# Patient Record
Sex: FEMALE | Race: BLACK OR AFRICAN AMERICAN | NOT HISPANIC OR LATINO
[De-identification: names, ages, dates, MRNs, and addresses within clinical notes are randomized per-mention and may not be internally consistent; named-entity substitution may affect disease eponyms.]

---

## 2017-02-03 ENCOUNTER — APPOINTMENT (OUTPATIENT)
Dept: INTERNAL MEDICINE | Facility: CLINIC | Age: 27
End: 2017-02-03

## 2017-02-03 ENCOUNTER — MED ADMIN CHARGE (OUTPATIENT)
Age: 27
End: 2017-02-03

## 2017-02-03 VITALS
WEIGHT: 147 LBS | SYSTOLIC BLOOD PRESSURE: 115 MMHG | DIASTOLIC BLOOD PRESSURE: 62 MMHG | TEMPERATURE: 98.1 F | HEART RATE: 65 BPM | RESPIRATION RATE: 16 BRPM | BODY MASS INDEX: 23.63 KG/M2 | HEIGHT: 66 IN | OXYGEN SATURATION: 98 %

## 2017-02-03 DIAGNOSIS — Z78.9 OTHER SPECIFIED HEALTH STATUS: ICD-10-CM

## 2017-02-05 LAB
ALBUMIN SERPL ELPH-MCNC: 4.5 G/DL
ALP BLD-CCNC: 64 U/L
ALT SERPL-CCNC: 8 U/L
ANION GAP SERPL CALC-SCNC: 18 MMOL/L
AST SERPL-CCNC: 15 U/L
BASOPHILS # BLD AUTO: 0.02 K/UL
BASOPHILS NFR BLD AUTO: 0.3 %
BILIRUB SERPL-MCNC: 0.5 MG/DL
BUN SERPL-MCNC: 12 MG/DL
C TRACH DNA SPEC QL NAA+PROBE: NORMAL
C TRACH RRNA SPEC QL NAA+PROBE: NORMAL
CALCIUM SERPL-MCNC: 9.5 MG/DL
CHLORIDE SERPL-SCNC: 102 MMOL/L
CHOLEST SERPL-MCNC: 150 MG/DL
CHOLEST/HDLC SERPL: 1.9 RATIO
CO2 SERPL-SCNC: 21 MMOL/L
CREAT SERPL-MCNC: 0.6 MG/DL
EOSINOPHIL # BLD AUTO: 0.01 K/UL
EOSINOPHIL NFR BLD AUTO: 0.1 %
GLUCOSE SERPL-MCNC: 81 MG/DL
HBA1C MFR BLD HPLC: 5.9 %
HBV CORE IGG+IGM SER QL: NONREACTIVE
HBV SURFACE AB SER QL: REACTIVE
HBV SURFACE AG SER QL: NONREACTIVE
HCT VFR BLD CALC: 40 %
HCV AB SER QL: NONREACTIVE
HCV S/CO RATIO: 0.22 S/CO
HDLC SERPL-MCNC: 80 MG/DL
HGB BLD-MCNC: 12.4 G/DL
HIV1+2 AB SPEC QL IA.RAPID: NONREACTIVE
IMM GRANULOCYTES NFR BLD AUTO: 0 %
LDLC SERPL CALC-MCNC: 62 MG/DL
LYMPHOCYTES # BLD AUTO: 1.92 K/UL
LYMPHOCYTES NFR BLD AUTO: 27.1 %
MAN DIFF?: NORMAL
MCHC RBC-ENTMCNC: 27 PG
MCHC RBC-ENTMCNC: 31 GM/DL
MCV RBC AUTO: 87 FL
MONOCYTES # BLD AUTO: 0.42 K/UL
MONOCYTES NFR BLD AUTO: 5.9 %
N GONORRHOEA DNA SPEC QL NAA+PROBE: NORMAL
N GONORRHOEA RRNA SPEC QL NAA+PROBE: NORMAL
NEUTROPHILS # BLD AUTO: 4.71 K/UL
NEUTROPHILS NFR BLD AUTO: 66.6 %
PLATELET # BLD AUTO: 383 K/UL
POTASSIUM SERPL-SCNC: 4.4 MMOL/L
PROT SERPL-MCNC: 7.8 G/DL
RBC # BLD: 4.6 M/UL
RBC # FLD: 14.6 %
SODIUM SERPL-SCNC: 141 MMOL/L
SOURCE AMPLIFICATION: NORMAL
T PALLIDUM AB SER QL IA: NEGATIVE
TRIGL SERPL-MCNC: 38 MG/DL
TSH SERPL-ACNC: 0.81 UIU/ML
WBC # FLD AUTO: 7.08 K/UL

## 2017-02-16 ENCOUNTER — APPOINTMENT (OUTPATIENT)
Dept: INTERNAL MEDICINE | Facility: CLINIC | Age: 27
End: 2017-02-16

## 2017-02-16 VITALS
HEART RATE: 110 BPM | OXYGEN SATURATION: 98 % | BODY MASS INDEX: 23.63 KG/M2 | TEMPERATURE: 97.9 F | WEIGHT: 147 LBS | SYSTOLIC BLOOD PRESSURE: 114 MMHG | DIASTOLIC BLOOD PRESSURE: 60 MMHG | HEIGHT: 66 IN

## 2017-02-21 ENCOUNTER — APPOINTMENT (OUTPATIENT)
Dept: ALLERGY | Facility: CLINIC | Age: 27
End: 2017-02-21

## 2017-02-21 VITALS
WEIGHT: 147 LBS | DIASTOLIC BLOOD PRESSURE: 80 MMHG | HEIGHT: 66 IN | SYSTOLIC BLOOD PRESSURE: 120 MMHG | RESPIRATION RATE: 14 BRPM | HEART RATE: 88 BPM | BODY MASS INDEX: 23.63 KG/M2

## 2017-03-01 ENCOUNTER — HOSPITAL ENCOUNTER (EMERGENCY)
Facility: HOSPITAL | Age: 27
Discharge: HOME | End: 2017-03-01
Attending: EMERGENCY MEDICINE | Admitting: EMERGENCY MEDICINE
Payer: COMMERCIAL

## 2017-03-01 VITALS
TEMPERATURE: 97.8 F | HEIGHT: 66 IN | BODY MASS INDEX: 23.63 KG/M2 | DIASTOLIC BLOOD PRESSURE: 74 MMHG | SYSTOLIC BLOOD PRESSURE: 116 MMHG | WEIGHT: 147 LBS | RESPIRATION RATE: 16 BRPM | HEART RATE: 95 BPM | OXYGEN SATURATION: 100 %

## 2017-03-01 DIAGNOSIS — J02.9 PHARYNGITIS: Primary | ICD-10-CM

## 2017-03-01 LAB — S PYO AG THROAT QL: NEGATIVE

## 2017-03-01 PROCEDURE — 99284 EMERGENCY DEPT VISIT MOD MDM: CPT

## 2017-03-01 PROCEDURE — 87430 STREP A AG IA: CPT | Performed by: EMERGENCY MEDICINE

## 2017-03-01 PROCEDURE — 87070 CULTURE OTHR SPECIMN AEROBIC: CPT | Performed by: EMERGENCY MEDICINE

## 2017-03-01 RX ORDER — PREDNISONE 20 MG/1
60 TABLET ORAL ONCE
Status: COMPLETED | OUTPATIENT
Start: 2017-03-01 | End: 2017-03-01

## 2017-03-01 RX ORDER — PREDNISONE 20 MG/1
60 TABLET ORAL DAILY
Qty: 15 TABLET | Refills: 0 | Status: SHIPPED | OUTPATIENT
Start: 2017-03-01 | End: 2017-03-06

## 2017-03-01 RX ADMIN — PREDNISONE 60 MG: 20 TABLET ORAL at 23:43

## 2017-03-04 LAB — BACTERIA THROAT CULT: NORMAL

## 2017-03-07 ENCOUNTER — APPOINTMENT (OUTPATIENT)
Dept: INTERNAL MEDICINE | Facility: CLINIC | Age: 27
End: 2017-03-07

## 2017-03-07 VITALS
HEIGHT: 66 IN | SYSTOLIC BLOOD PRESSURE: 115 MMHG | DIASTOLIC BLOOD PRESSURE: 70 MMHG | BODY MASS INDEX: 22.98 KG/M2 | WEIGHT: 143 LBS | HEART RATE: 74 BPM | TEMPERATURE: 98.5 F

## 2017-03-07 RX ORDER — PREDNISONE 20 MG/1
20 TABLET ORAL
Qty: 15 | Refills: 0 | Status: COMPLETED | COMMUNITY
Start: 2017-03-01

## 2017-03-10 ENCOUNTER — APPOINTMENT (OUTPATIENT)
Dept: INTERNAL MEDICINE | Facility: CLINIC | Age: 27
End: 2017-03-10

## 2017-03-15 ENCOUNTER — APPOINTMENT (OUTPATIENT)
Dept: OTOLARYNGOLOGY | Facility: CLINIC | Age: 27
End: 2017-03-15

## 2017-03-20 ENCOUNTER — APPOINTMENT (OUTPATIENT)
Dept: OTOLARYNGOLOGY | Facility: CLINIC | Age: 27
End: 2017-03-20

## 2017-03-20 VITALS
WEIGHT: 143 LBS | DIASTOLIC BLOOD PRESSURE: 72 MMHG | SYSTOLIC BLOOD PRESSURE: 112 MMHG | OXYGEN SATURATION: 98 % | BODY MASS INDEX: 22.98 KG/M2 | HEART RATE: 90 BPM | TEMPERATURE: 98.8 F | HEIGHT: 66 IN

## 2017-03-24 ENCOUNTER — APPOINTMENT (OUTPATIENT)
Dept: OTOLARYNGOLOGY | Facility: CLINIC | Age: 27
End: 2017-03-24

## 2017-04-20 ENCOUNTER — APPOINTMENT (OUTPATIENT)
Dept: NEUROLOGY | Facility: CLINIC | Age: 27
End: 2017-04-20

## 2017-05-05 ENCOUNTER — APPOINTMENT (OUTPATIENT)
Dept: INTERNAL MEDICINE | Facility: CLINIC | Age: 27
End: 2017-05-05

## 2017-05-05 VITALS
RESPIRATION RATE: 14 BRPM | DIASTOLIC BLOOD PRESSURE: 80 MMHG | SYSTOLIC BLOOD PRESSURE: 115 MMHG | BODY MASS INDEX: 22.18 KG/M2 | HEART RATE: 75 BPM | WEIGHT: 138 LBS | HEIGHT: 66 IN | TEMPERATURE: 98.2 F

## 2017-05-05 LAB — HBA1C MFR BLD HPLC: 5.9

## 2017-05-12 ENCOUNTER — APPOINTMENT (OUTPATIENT)
Dept: NEUROLOGY | Facility: CLINIC | Age: 27
End: 2017-05-12

## 2017-05-12 VITALS
DIASTOLIC BLOOD PRESSURE: 70 MMHG | HEIGHT: 66 IN | BODY MASS INDEX: 22.02 KG/M2 | WEIGHT: 137 LBS | SYSTOLIC BLOOD PRESSURE: 112 MMHG

## 2017-08-08 ENCOUNTER — APPOINTMENT (OUTPATIENT)
Dept: INTERNAL MEDICINE | Facility: CLINIC | Age: 27
End: 2017-08-08
Payer: COMMERCIAL

## 2017-08-08 VITALS
WEIGHT: 139 LBS | DIASTOLIC BLOOD PRESSURE: 68 MMHG | HEIGHT: 66 IN | HEART RATE: 71 BPM | BODY MASS INDEX: 22.34 KG/M2 | RESPIRATION RATE: 14 BRPM | OXYGEN SATURATION: 98 % | TEMPERATURE: 98.4 F | SYSTOLIC BLOOD PRESSURE: 116 MMHG

## 2017-08-08 LAB — HBA1C MFR BLD HPLC: 5.9

## 2017-08-08 PROCEDURE — 99214 OFFICE O/P EST MOD 30 MIN: CPT | Mod: 25

## 2017-08-08 PROCEDURE — 83036 HEMOGLOBIN GLYCOSYLATED A1C: CPT | Mod: QW

## 2017-08-19 ENCOUNTER — FORM ENCOUNTER (OUTPATIENT)
Age: 27
End: 2017-08-19

## 2017-08-20 ENCOUNTER — OUTPATIENT (OUTPATIENT)
Dept: OUTPATIENT SERVICES | Facility: HOSPITAL | Age: 27
LOS: 1 days | End: 2017-08-20
Payer: COMMERCIAL

## 2017-08-20 ENCOUNTER — APPOINTMENT (OUTPATIENT)
Dept: MRI IMAGING | Facility: CLINIC | Age: 27
End: 2017-08-20
Payer: COMMERCIAL

## 2017-08-20 DIAGNOSIS — Q85.00 NEUROFIBROMATOSIS, UNSPECIFIED: ICD-10-CM

## 2017-08-20 PROCEDURE — 70551 MRI BRAIN STEM W/O DYE: CPT | Mod: 26

## 2017-08-20 PROCEDURE — 70551 MRI BRAIN STEM W/O DYE: CPT

## 2017-09-05 ENCOUNTER — APPOINTMENT (OUTPATIENT)
Dept: DERMATOLOGY | Facility: CLINIC | Age: 27
End: 2017-09-05
Payer: COMMERCIAL

## 2017-09-05 ENCOUNTER — RESULT REVIEW (OUTPATIENT)
Age: 27
End: 2017-09-05

## 2017-09-05 VITALS
HEIGHT: 66 IN | DIASTOLIC BLOOD PRESSURE: 60 MMHG | WEIGHT: 140 LBS | SYSTOLIC BLOOD PRESSURE: 102 MMHG | BODY MASS INDEX: 22.5 KG/M2

## 2017-09-05 DIAGNOSIS — L70.0 ACNE VULGARIS: ICD-10-CM

## 2017-09-05 PROBLEM — Z00.00 ENCOUNTER FOR PREVENTIVE HEALTH EXAMINATION: Noted: 2017-09-05

## 2017-09-05 PROCEDURE — 99243 OFF/OP CNSLTJ NEW/EST LOW 30: CPT | Mod: GC

## 2017-09-28 ENCOUNTER — APPOINTMENT (OUTPATIENT)
Dept: PLASTIC SURGERY | Facility: CLINIC | Age: 27
End: 2017-09-28
Payer: COMMERCIAL

## 2017-09-28 VITALS
BODY MASS INDEX: 22.02 KG/M2 | HEIGHT: 66 IN | HEART RATE: 63 BPM | SYSTOLIC BLOOD PRESSURE: 113 MMHG | WEIGHT: 137 LBS | DIASTOLIC BLOOD PRESSURE: 74 MMHG

## 2017-09-28 DIAGNOSIS — Z78.9 OTHER SPECIFIED HEALTH STATUS: ICD-10-CM

## 2017-09-28 PROCEDURE — 99203 OFFICE O/P NEW LOW 30 MIN: CPT

## 2017-11-09 ENCOUNTER — OUTPATIENT (OUTPATIENT)
Dept: OUTPATIENT SERVICES | Facility: HOSPITAL | Age: 27
LOS: 1 days | End: 2017-11-09
Payer: COMMERCIAL

## 2017-11-09 VITALS
OXYGEN SATURATION: 100 % | SYSTOLIC BLOOD PRESSURE: 109 MMHG | RESPIRATION RATE: 14 BRPM | WEIGHT: 139.99 LBS | TEMPERATURE: 99 F | DIASTOLIC BLOOD PRESSURE: 70 MMHG | HEIGHT: 66 IN | HEART RATE: 84 BPM

## 2017-11-09 DIAGNOSIS — Z01.818 ENCOUNTER FOR OTHER PREPROCEDURAL EXAMINATION: ICD-10-CM

## 2017-11-09 DIAGNOSIS — D36.13 BENIGN NEOPLASM OF PERIPHERAL NERVES AND AUTONOMIC NERVOUS SYSTEM OF LOWER LIMB, INCLUDING HIP: ICD-10-CM

## 2017-11-09 DIAGNOSIS — D36.10 BENIGN NEOPLASM OF PERIPHERAL NERVES AND AUTONOMIC NERVOUS SYSTEM, UNSPECIFIED: ICD-10-CM

## 2017-11-09 PROCEDURE — 85027 COMPLETE CBC AUTOMATED: CPT

## 2017-11-09 PROCEDURE — G0463: CPT

## 2017-11-09 RX ORDER — SODIUM CHLORIDE 9 MG/ML
3 INJECTION INTRAMUSCULAR; INTRAVENOUS; SUBCUTANEOUS EVERY 8 HOURS
Qty: 0 | Refills: 0 | Status: DISCONTINUED | OUTPATIENT
Start: 2017-11-13 | End: 2017-11-28

## 2017-11-09 RX ORDER — LIDOCAINE HCL 20 MG/ML
0.2 VIAL (ML) INJECTION ONCE
Qty: 0 | Refills: 0 | Status: DISCONTINUED | OUTPATIENT
Start: 2017-11-13 | End: 2017-11-28

## 2017-11-09 RX ORDER — ACETAMINOPHEN 500 MG
975 TABLET ORAL ONCE
Qty: 0 | Refills: 0 | Status: COMPLETED | OUTPATIENT
Start: 2017-11-13 | End: 2017-11-13

## 2017-11-09 NOTE — H&P PST ADULT - PMH
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

## 2017-11-09 NOTE — H&P PST ADULT - NSANTHOSAYNRD_GEN_A_CORE
No. SULLY screening performed.  STOP BANG Legend: 0-2 = LOW Risk; 3-4 = INTERMEDIATE Risk; 5-8 = HIGH Risk

## 2017-11-09 NOTE — H&P PST ADULT - PROBLEM SELECTOR PLAN 1
excision of neurofibroma right thigh   PST instructions provided, soap given, patient verbalized understanding.   UcG on admission

## 2017-11-09 NOTE — H&P PST ADULT - HISTORY OF PRESENT ILLNESS
27 yr old female with right thigh neurofibroma ( 13 cm ) , presents to [facility] for scheduled excision on 11/13/17. Patient denies fever, chills , no acute complains.

## 2017-11-10 LAB
HCT VFR BLD CALC: 37.7 % — SIGNIFICANT CHANGE UP (ref 34.5–45)
HGB BLD-MCNC: 12.6 G/DL — SIGNIFICANT CHANGE UP (ref 11.5–15.5)
MCHC RBC-ENTMCNC: 29.2 PG — SIGNIFICANT CHANGE UP (ref 27–34)
MCHC RBC-ENTMCNC: 33.4 GM/DL — SIGNIFICANT CHANGE UP (ref 32–36)
MCV RBC AUTO: 87.3 FL — SIGNIFICANT CHANGE UP (ref 80–100)
PLATELET # BLD AUTO: 293 K/UL — SIGNIFICANT CHANGE UP (ref 150–400)
RBC # BLD: 4.32 M/UL — SIGNIFICANT CHANGE UP (ref 3.8–5.2)
RBC # FLD: 14.9 % — HIGH (ref 10.3–14.5)
WBC # BLD: 6.81 K/UL — SIGNIFICANT CHANGE UP (ref 3.8–10.5)
WBC # FLD AUTO: 6.81 K/UL — SIGNIFICANT CHANGE UP (ref 3.8–10.5)

## 2017-11-12 RX ORDER — ONDANSETRON 8 MG/1
4 TABLET, FILM COATED ORAL ONCE
Qty: 0 | Refills: 0 | Status: DISCONTINUED | OUTPATIENT
Start: 2017-11-13 | End: 2017-11-28

## 2017-11-12 RX ORDER — CELECOXIB 200 MG/1
200 CAPSULE ORAL ONCE
Qty: 0 | Refills: 0 | Status: DISCONTINUED | OUTPATIENT
Start: 2017-11-13 | End: 2017-11-28

## 2017-11-12 RX ORDER — SODIUM CHLORIDE 9 MG/ML
1000 INJECTION, SOLUTION INTRAVENOUS
Qty: 0 | Refills: 0 | Status: DISCONTINUED | OUTPATIENT
Start: 2017-11-13 | End: 2017-11-28

## 2017-11-13 ENCOUNTER — TRANSCRIPTION ENCOUNTER (OUTPATIENT)
Age: 27
End: 2017-11-13

## 2017-11-13 ENCOUNTER — APPOINTMENT (OUTPATIENT)
Dept: PLASTIC SURGERY | Facility: HOSPITAL | Age: 27
End: 2017-11-13

## 2017-11-13 ENCOUNTER — OUTPATIENT (OUTPATIENT)
Dept: OUTPATIENT SERVICES | Facility: HOSPITAL | Age: 27
LOS: 1 days | End: 2017-11-13
Payer: COMMERCIAL

## 2017-11-13 ENCOUNTER — RESULT REVIEW (OUTPATIENT)
Age: 27
End: 2017-11-13

## 2017-11-13 VITALS
OXYGEN SATURATION: 100 % | RESPIRATION RATE: 20 BRPM | HEIGHT: 66 IN | WEIGHT: 139.99 LBS | HEART RATE: 63 BPM | SYSTOLIC BLOOD PRESSURE: 103 MMHG | TEMPERATURE: 98 F | DIASTOLIC BLOOD PRESSURE: 71 MMHG

## 2017-11-13 VITALS
HEART RATE: 76 BPM | RESPIRATION RATE: 16 BRPM | OXYGEN SATURATION: 99 % | TEMPERATURE: 100 F | SYSTOLIC BLOOD PRESSURE: 123 MMHG | DIASTOLIC BLOOD PRESSURE: 66 MMHG

## 2017-11-13 DIAGNOSIS — D36.10 BENIGN NEOPLASM OF PERIPHERAL NERVES AND AUTONOMIC NERVOUS SYSTEM, UNSPECIFIED: ICD-10-CM

## 2017-11-13 PROCEDURE — 13133 CMPLX RPR F/C/C/M/N/AX/G/H/F: CPT | Mod: 59

## 2017-11-13 PROCEDURE — 88304 TISSUE EXAM BY PATHOLOGIST: CPT | Mod: 26

## 2017-11-13 PROCEDURE — 11406 EXC TR-EXT B9+MARG >4.0 CM: CPT

## 2017-11-13 PROCEDURE — 88304 TISSUE EXAM BY PATHOLOGIST: CPT

## 2017-11-13 PROCEDURE — 13132 CMPLX RPR F/C/C/M/N/AX/G/H/F: CPT

## 2017-11-13 PROCEDURE — 13122 CMPLX RPR S/A/L ADDL 5 CM/>: CPT

## 2017-11-13 PROCEDURE — 13121 CMPLX RPR S/A/L 2.6-7.5 CM: CPT

## 2017-11-13 RX ORDER — CELECOXIB 200 MG/1
200 CAPSULE ORAL ONCE
Qty: 0 | Refills: 0 | Status: COMPLETED | OUTPATIENT
Start: 2017-11-13 | End: 2017-11-13

## 2017-11-13 RX ORDER — FAMOTIDINE 10 MG/ML
1 INJECTION INTRAVENOUS
Qty: 0 | Refills: 0 | COMMUNITY

## 2017-11-13 RX ADMIN — CELECOXIB 200 MILLIGRAM(S): 200 CAPSULE ORAL at 08:48

## 2017-11-13 RX ADMIN — Medication 975 MILLIGRAM(S): at 08:48

## 2017-11-13 NOTE — ASU DISCHARGE PLAN (ADULT/PEDIATRIC). - MEDICATION SUMMARY - MEDICATIONS TO TAKE
I will START or STAY ON the medications listed below when I get home from the hospital:    oxyCODONE-acetaminophen 5 mg-325 mg oral tablet  -- 1 tab(s) by mouth every 6 hours, As Needed -for moderate pain MDD:4 tabs  -- Caution federal law prohibits the transfer of this drug to any person other  than the person for whom it was prescribed.  May cause drowsiness.  Alcohol may intensify this effect.  Use care when operating dangerous machinery.  This prescription cannot be refilled.  This product contains acetaminophen.  Do not use  with any other product containing acetaminophen to prevent possible liver damage.  Using more of this medication than prescribed may cause serious breathing problems.    -- Indication: For Post-operative pain    biotin  -- 2 tab(s) by mouth once a day  -- Indication: For Home supplement

## 2017-11-13 NOTE — ASU DISCHARGE PLAN (ADULT/PEDIATRIC). - MEDICATION SUMMARY - MEDICATIONS TO STOP TAKING
I will STOP taking the medications listed below when I get home from the hospital:    famotidine 20 mg oral tablet  -- 1 tab(s) by mouth twice, as directed

## 2017-11-13 NOTE — ASU DISCHARGE PLAN (ADULT/PEDIATRIC). - SPECIAL INSTRUCTIONS
Leave ace wrap on for 3 days. May remove prior to shower. May shower 3 days after surgery. No vigorous activity; no sex; no scrubbing incision during shower.

## 2017-11-21 ENCOUNTER — APPOINTMENT (OUTPATIENT)
Dept: PLASTIC SURGERY | Facility: CLINIC | Age: 27
End: 2017-11-21
Payer: COMMERCIAL

## 2017-11-21 LAB — SURGICAL PATHOLOGY STUDY: SIGNIFICANT CHANGE UP

## 2017-11-21 PROCEDURE — 99024 POSTOP FOLLOW-UP VISIT: CPT

## 2017-12-05 ENCOUNTER — APPOINTMENT (OUTPATIENT)
Dept: PLASTIC SURGERY | Facility: CLINIC | Age: 27
End: 2017-12-05
Payer: COMMERCIAL

## 2017-12-05 PROCEDURE — 10160 PNXR ASPIR ABSC HMTMA BULLA: CPT

## 2017-12-08 ENCOUNTER — APPOINTMENT (OUTPATIENT)
Dept: PLASTIC SURGERY | Facility: CLINIC | Age: 27
End: 2017-12-08
Payer: COMMERCIAL

## 2017-12-08 PROCEDURE — 99024 POSTOP FOLLOW-UP VISIT: CPT

## 2017-12-12 ENCOUNTER — APPOINTMENT (OUTPATIENT)
Dept: PLASTIC SURGERY | Facility: CLINIC | Age: 27
End: 2017-12-12
Payer: COMMERCIAL

## 2017-12-12 PROCEDURE — 99024 POSTOP FOLLOW-UP VISIT: CPT

## 2017-12-14 ENCOUNTER — APPOINTMENT (OUTPATIENT)
Dept: PLASTIC SURGERY | Facility: CLINIC | Age: 27
End: 2017-12-14
Payer: COMMERCIAL

## 2017-12-14 PROCEDURE — 99024 POSTOP FOLLOW-UP VISIT: CPT

## 2017-12-19 ENCOUNTER — APPOINTMENT (OUTPATIENT)
Dept: PLASTIC SURGERY | Facility: CLINIC | Age: 27
End: 2017-12-19
Payer: COMMERCIAL

## 2017-12-19 PROCEDURE — 99024 POSTOP FOLLOW-UP VISIT: CPT

## 2017-12-26 ENCOUNTER — APPOINTMENT (OUTPATIENT)
Dept: PLASTIC SURGERY | Facility: CLINIC | Age: 27
End: 2017-12-26
Payer: COMMERCIAL

## 2017-12-26 PROCEDURE — 99024 POSTOP FOLLOW-UP VISIT: CPT

## 2018-01-09 ENCOUNTER — APPOINTMENT (OUTPATIENT)
Dept: PLASTIC SURGERY | Facility: CLINIC | Age: 28
End: 2018-01-09
Payer: COMMERCIAL

## 2018-01-09 ENCOUNTER — APPOINTMENT (OUTPATIENT)
Dept: INTERNAL MEDICINE | Facility: CLINIC | Age: 28
End: 2018-01-09
Payer: COMMERCIAL

## 2018-01-09 VITALS
BODY MASS INDEX: 22.82 KG/M2 | RESPIRATION RATE: 14 BRPM | TEMPERATURE: 98.5 F | HEART RATE: 70 BPM | SYSTOLIC BLOOD PRESSURE: 110 MMHG | DIASTOLIC BLOOD PRESSURE: 70 MMHG | HEIGHT: 66.25 IN | WEIGHT: 142 LBS

## 2018-01-09 DIAGNOSIS — L98.9 DISORDER OF THE SKIN AND SUBCUTANEOUS TISSUE, UNSPECIFIED: ICD-10-CM

## 2018-01-09 LAB — HBA1C MFR BLD HPLC: 5.1

## 2018-01-09 PROCEDURE — 99024 POSTOP FOLLOW-UP VISIT: CPT

## 2018-01-09 PROCEDURE — 90688 IIV4 VACCINE SPLT 0.5 ML IM: CPT

## 2018-01-09 PROCEDURE — G0008: CPT

## 2018-01-09 PROCEDURE — 83036 HEMOGLOBIN GLYCOSYLATED A1C: CPT | Mod: QW

## 2018-01-09 PROCEDURE — 99214 OFFICE O/P EST MOD 30 MIN: CPT | Mod: 25

## 2018-01-25 ENCOUNTER — APPOINTMENT (OUTPATIENT)
Dept: PLASTIC SURGERY | Facility: CLINIC | Age: 28
End: 2018-01-25
Payer: COMMERCIAL

## 2018-01-25 PROCEDURE — 99024 POSTOP FOLLOW-UP VISIT: CPT

## 2018-02-27 ENCOUNTER — APPOINTMENT (OUTPATIENT)
Dept: PLASTIC SURGERY | Facility: CLINIC | Age: 28
End: 2018-02-27
Payer: COMMERCIAL

## 2018-02-27 PROCEDURE — 99212 OFFICE O/P EST SF 10 MIN: CPT

## 2018-05-08 ENCOUNTER — APPOINTMENT (OUTPATIENT)
Dept: PLASTIC SURGERY | Facility: CLINIC | Age: 28
End: 2018-05-08
Payer: COMMERCIAL

## 2018-05-08 DIAGNOSIS — L91.0 HYPERTROPHIC SCAR: ICD-10-CM

## 2018-05-08 PROCEDURE — 99213 OFFICE O/P EST LOW 20 MIN: CPT

## 2018-05-09 PROBLEM — L91.0 HYPERTROPHIC SCAR: Status: ACTIVE | Noted: 2018-05-09

## 2018-06-19 ENCOUNTER — APPOINTMENT (OUTPATIENT)
Dept: INTERNAL MEDICINE | Facility: CLINIC | Age: 28
End: 2018-06-19
Payer: COMMERCIAL

## 2018-06-19 VITALS
BODY MASS INDEX: 23.3 KG/M2 | SYSTOLIC BLOOD PRESSURE: 100 MMHG | OXYGEN SATURATION: 99 % | WEIGHT: 145 LBS | HEIGHT: 66 IN | HEART RATE: 69 BPM | RESPIRATION RATE: 14 BRPM | DIASTOLIC BLOOD PRESSURE: 70 MMHG | TEMPERATURE: 98.4 F

## 2018-06-19 DIAGNOSIS — J30.9 ALLERGIC RHINITIS, UNSPECIFIED: ICD-10-CM

## 2018-06-19 DIAGNOSIS — S81.801D UNSPECIFIED OPEN WOUND, RIGHT LOWER LEG, SUBSEQUENT ENCOUNTER: ICD-10-CM

## 2018-06-19 DIAGNOSIS — Z87.19 PERSONAL HISTORY OF OTHER DISEASES OF THE DIGESTIVE SYSTEM: ICD-10-CM

## 2018-06-19 DIAGNOSIS — R73.03 PREDIABETES.: ICD-10-CM

## 2018-06-19 DIAGNOSIS — Z88.9 ALLERGY STATUS TO UNSPECIFIED DRUGS, MEDICAMENTS AND BIOLOGICAL SUBSTANCES: ICD-10-CM

## 2018-06-19 PROCEDURE — 99395 PREV VISIT EST AGE 18-39: CPT

## 2018-06-19 RX ORDER — ACETAMINOPHEN 325 MG
TABLET ORAL
Refills: 0 | Status: DISCONTINUED | COMMUNITY
End: 2018-06-19

## 2018-06-19 RX ORDER — MULTIVIT-MIN/IRON/FOLIC ACID/K 18-600-40
CAPSULE ORAL
Refills: 0 | Status: ACTIVE | COMMUNITY

## 2018-06-19 RX ORDER — TRETINOIN 0.5 MG/G
0.05 CREAM TOPICAL
Qty: 45 | Refills: 1 | Status: DISCONTINUED | COMMUNITY
Start: 2017-09-05 | End: 2018-06-19

## 2018-06-19 NOTE — PHYSICAL EXAM
[General Appearance - Alert] : alert [General Appearance - In No Acute Distress] : in no acute distress [General Appearance - Well-Appearing] : healthy appearing [Sclera] : the sclera and conjunctiva were normal [PERRL With Normal Accommodation] : pupils were equal in size, round, and reactive to light [Extraocular Movements] : extraocular movements were intact [Outer Ear] : the ears and nose were normal in appearance [Nasal Cavity] : the nasal mucosa and septum were normal [Oropharynx] : the oropharynx was normal [Neck Appearance] : the appearance of the neck was normal [] : the neck was supple [Thyroid Diffuse Enlargement] : the thyroid was not enlarged [Respiration, Rhythm And Depth] : normal respiratory rhythm and effort [Auscultation Breath Sounds / Voice Sounds] : lungs were clear to auscultation bilaterally [Heart Rate And Rhythm] : heart rate was normal and rhythm regular [Heart Sounds] : normal S1 and S2 [Murmurs] : no murmurs [Full Pulse] : the pedal pulses are present [Edema] : there was no peripheral edema [Bowel Sounds] : normal bowel sounds [Abdomen Soft] : soft [Abdomen Tenderness] : non-tender [Cervical Lymph Nodes Enlarged Posterior Bilaterally] : posterior cervical [Cervical Lymph Nodes Enlarged Anterior Bilaterally] : anterior cervical [Supraclavicular Lymph Nodes Enlarged Bilaterally] : supraclavicular [No CVA Tenderness] : no ~M costovertebral angle tenderness [No Spinal Tenderness] : no spinal tenderness [Abnormal Walk] : normal gait [Musculoskeletal - Swelling] : no joint swelling seen [No Focal Deficits] : no focal deficits [Oriented To Time, Place, And Person] : oriented to person, place, and time [Affect] : the affect was normal [Mood] : the mood was normal [FreeTextEntry1] : scattered freckles and skin lesions on face, trunk, abdomen, hands, legs.  Right thigh: +dressing, clean/dry and intact [Over the Past 2 Weeks, Have You Felt Down, Depressed, or Hopeless?] : 1.) Over the past 2 weeks, have you felt down, depressed, or hopeless? No [Over the Past 2 Weeks, Have You Felt Little Interest or Pleasure Doing Things?] : 2.) Over the past 2 weeks, have you felt little interest or pleasure doing things? No

## 2018-06-19 NOTE — ASSESSMENT
[FreeTextEntry1] : \par finger pain- right pointer, s/p min trauma- improving on own; denies weakness or limitations \par -advised ice, Tylenol or NSAID with food prn\par -declines ortho eval or imaging\par -advised to f/u if sx's worsen\par \par allergic rhinitis- asx\par -advised flonase prn, escribed\par -advised OTC antihistamine (ie claritin) prn\par \par hx throat sx's- resolved, ? 2/2 food allergy (mustard/tomatoe) vs GERD- now resolved\par -eating shrimp since incident w/o any SEs reported\par -seen by A/I 2/17 with negative food allergy testing, awaiting lab testing- encouraged\par -seen by ENT 3/17 with fiberoptic exam showing reflux, allergic rhinitis- is s/p 30d trial of omeprazole with sx resolution and off PPI x few months.  F/U pending, encouraged if allergy testing negative.\par -hx OTC PPI prn\par -advised to cont. avoidance of reflux causing foods and meals close to bedtime\par -declines epi pen\par -advised ER visit if any onset of facial/tongue swelling, rash or sob with eating food.\par \par preDM- 1/18 POCT A1c 5.1 (was 5.9 prior)\par -cont ADA diet and exercise advised\par -check A1c \par -DM educator referral given prior- pending\par \par neurofibromatosis- since infancy, s/p lesion removal 11/17 at right thigh- healing\par -followed by neuro, seen 5/17- hx negative MRI brain 9/17.  F/U pending.\par -followed by derm, seen 9/17\par -followed by plastic surgery, seen  5/18 noted healed and no further intervention advised.\par \par \par HCM\par -check screening labs; agreeable to HIV/STD screening\par -STD and pregnancy prevention with regular use of condoms counseled\par -hx flu shot 1/18\par -hx Tdap 2/17\par -hx hep B series, immune per 2/17 labs\par -hx HPV series\par -hx negative PAP 9/17 GYN per pt\par -derm referral for screening- yearly advised.  Regular use of sun block for skin cancer prevention counseled.\par -optho referral for screening given prior- pending\par \par \par Pt's cell: [phone]\par Pt declines f/u appt, yearly CPE and f/u as needed advised.

## 2018-06-19 NOTE — REVIEW OF SYSTEMS
[see HPI] : see HPI [Negative] : Psychiatric [Fever] : no fever [Chills] : no chills [Earache] : no earache [Loss Of Hearing] : no hearing loss [Sore Throat] : no sore throat [Hoarseness] : no hoarseness [Chest Pain] : no chest pain [Palpitations] : no palpitations [Cough] : no cough [SOB on Exertion] : no shortness of breath during exertion [Abdominal Pain] : no abdominal pain [Melena] : no melena [Dizziness] : no dizziness [Fainting] : no fainting [Limb Weakness] : no limb weakness [Difficulty Walking] : no difficulty walking

## 2018-06-19 NOTE — HEALTH RISK ASSESSMENT
[Patient reported PAP Smear was normal] : Patient reported PAP Smear was normal [0] : 2) Feeling down, depressed, or hopeless: Not at all (0) [PapSmearDate] : 9/17

## 2018-06-24 LAB
ALBUMIN SERPL ELPH-MCNC: 4.4 G/DL
ALP BLD-CCNC: 52 U/L
ALT SERPL-CCNC: 8 U/L
ANION GAP SERPL CALC-SCNC: 12 MMOL/L
AST SERPL-CCNC: 19 U/L
BASOPHILS # BLD AUTO: 0.04 K/UL
BASOPHILS NFR BLD AUTO: 1 %
BILIRUB SERPL-MCNC: 0.4 MG/DL
BUN SERPL-MCNC: 14 MG/DL
C TRACH RRNA SPEC QL NAA+PROBE: NOT DETECTED
CALCIUM SERPL-MCNC: 9.4 MG/DL
CHLORIDE SERPL-SCNC: 102 MMOL/L
CHOLEST SERPL-MCNC: 159 MG/DL
CHOLEST/HDLC SERPL: 1.8 RATIO
CO2 SERPL-SCNC: 24 MMOL/L
CREAT SERPL-MCNC: 0.73 MG/DL
EOSINOPHIL # BLD AUTO: 0.05 K/UL
EOSINOPHIL NFR BLD AUTO: 1.2 %
GLUCOSE SERPL-MCNC: 80 MG/DL
HBA1C MFR BLD HPLC: 5.6 %
HBV SURFACE AB SER QL: REACTIVE
HBV SURFACE AG SER QL: NONREACTIVE
HCT VFR BLD CALC: 37.3 %
HCV AB SER QL: NONREACTIVE
HCV S/CO RATIO: 0.15 S/CO
HDLC SERPL-MCNC: 86 MG/DL
HGB BLD-MCNC: 12.1 G/DL
HIV1+2 AB SPEC QL IA.RAPID: NONREACTIVE
IMM GRANULOCYTES NFR BLD AUTO: 0 %
LDLC SERPL CALC-MCNC: 67 MG/DL
LYMPHOCYTES # BLD AUTO: 1.63 K/UL
LYMPHOCYTES NFR BLD AUTO: 39.3 %
MAN DIFF?: NORMAL
MCHC RBC-ENTMCNC: 28.4 PG
MCHC RBC-ENTMCNC: 32.4 GM/DL
MCV RBC AUTO: 87.6 FL
MONOCYTES # BLD AUTO: 0.41 K/UL
MONOCYTES NFR BLD AUTO: 9.9 %
N GONORRHOEA RRNA SPEC QL NAA+PROBE: NOT DETECTED
NEUTROPHILS # BLD AUTO: 2.02 K/UL
NEUTROPHILS NFR BLD AUTO: 48.6 %
PLATELET # BLD AUTO: 332 K/UL
POTASSIUM SERPL-SCNC: 3.8 MMOL/L
PROT SERPL-MCNC: 7.5 G/DL
RBC # BLD: 4.26 M/UL
RBC # FLD: 14.3 %
SODIUM SERPL-SCNC: 138 MMOL/L
SOURCE AMPLIFICATION: NORMAL
T PALLIDUM AB SER QL IA: NEGATIVE
TRIGL SERPL-MCNC: 29 MG/DL
TSH SERPL-ACNC: 1.63 UIU/ML
WBC # FLD AUTO: 4.15 K/UL

## 2018-07-16 PROBLEM — D36.13 BENIGN NEOPLASM OF PERIPHERAL NERVES AND AUTONOMIC NERVOUS SYSTEM OF LOWER LIMB, INCLUDING HIP: Chronic | Status: ACTIVE | Noted: 2017-11-09

## 2018-08-13 PROBLEM — L81.3 CAFE AU LAIT SPOTS: Chronic | Status: ACTIVE | Noted: 2017-11-09

## 2018-08-13 PROBLEM — Z87.01 PERSONAL HISTORY OF PNEUMONIA (RECURRENT): Chronic | Status: ACTIVE | Noted: 2017-11-09

## 2018-08-13 PROBLEM — Q85.00 NEUROFIBROMATOSIS, UNSPECIFIED: Chronic | Status: ACTIVE | Noted: 2017-11-09

## 2018-08-27 ENCOUNTER — APPOINTMENT (OUTPATIENT)
Dept: ALLERGY | Facility: CLINIC | Age: 28
End: 2018-08-27
Payer: COMMERCIAL

## 2018-08-27 VITALS
BODY MASS INDEX: 22.98 KG/M2 | RESPIRATION RATE: 14 BRPM | HEART RATE: 72 BPM | WEIGHT: 143 LBS | SYSTOLIC BLOOD PRESSURE: 110 MMHG | HEIGHT: 66 IN | DIASTOLIC BLOOD PRESSURE: 70 MMHG

## 2018-08-27 PROCEDURE — 95018 ALL TSTG PERQ&IQ DRUGS/BIOL: CPT

## 2018-08-27 PROCEDURE — 95004 PERQ TESTS W/ALRGNC XTRCS: CPT

## 2018-08-27 PROCEDURE — 99213 OFFICE O/P EST LOW 20 MIN: CPT | Mod: 25

## 2018-08-27 RX ORDER — FLUTICASONE PROPIONATE 50 UG/1
50 SPRAY, METERED NASAL
Qty: 1 | Refills: 3 | Status: DISCONTINUED | COMMUNITY
Start: 2018-06-19 | End: 2018-08-27

## 2018-08-30 ENCOUNTER — RESULT REVIEW (OUTPATIENT)
Age: 28
End: 2018-08-30

## 2018-11-20 ENCOUNTER — APPOINTMENT (OUTPATIENT)
Dept: PLASTIC SURGERY | Facility: CLINIC | Age: 28
End: 2018-11-20
Payer: COMMERCIAL

## 2018-11-20 ENCOUNTER — APPOINTMENT (OUTPATIENT)
Dept: NEUROLOGY | Facility: CLINIC | Age: 28
End: 2018-11-20
Payer: COMMERCIAL

## 2018-11-20 VITALS
OXYGEN SATURATION: 98 % | HEIGHT: 66 IN | TEMPERATURE: 98.8 F | RESPIRATION RATE: 18 BRPM | DIASTOLIC BLOOD PRESSURE: 60 MMHG | HEART RATE: 80 BPM | WEIGHT: 154 LBS | BODY MASS INDEX: 24.75 KG/M2 | SYSTOLIC BLOOD PRESSURE: 98 MMHG

## 2018-11-20 PROCEDURE — 99213 OFFICE O/P EST LOW 20 MIN: CPT

## 2018-11-20 PROCEDURE — 99214 OFFICE O/P EST MOD 30 MIN: CPT

## 2018-12-18 ENCOUNTER — LABORATORY RESULT (OUTPATIENT)
Age: 28
End: 2018-12-18

## 2018-12-18 ENCOUNTER — APPOINTMENT (OUTPATIENT)
Dept: PLASTIC SURGERY | Facility: CLINIC | Age: 28
End: 2018-12-18
Payer: COMMERCIAL

## 2018-12-18 PROCEDURE — 13151 CMPLX RPR E/N/E/L 1.1-2.5 CM: CPT

## 2018-12-18 PROCEDURE — 11442 EXC FACE-MM B9+MARG 1.1-2 CM: CPT

## 2018-12-26 ENCOUNTER — APPOINTMENT (OUTPATIENT)
Dept: PLASTIC SURGERY | Facility: CLINIC | Age: 28
End: 2018-12-26
Payer: COMMERCIAL

## 2018-12-26 PROCEDURE — 99024 POSTOP FOLLOW-UP VISIT: CPT

## 2019-01-17 ENCOUNTER — APPOINTMENT (OUTPATIENT)
Dept: PLASTIC SURGERY | Facility: CLINIC | Age: 29
End: 2019-01-17
Payer: COMMERCIAL

## 2019-01-17 PROCEDURE — 99212 OFFICE O/P EST SF 10 MIN: CPT

## 2019-01-17 NOTE — REASON FOR VISIT
[FreeTextEntry1] : DOP: 12/18/2018 s/p excisional bx and closure of Right eyelid mass. Pt is doing better w/time, denies any discomfort. Here for follow up.

## 2019-01-22 ENCOUNTER — APPOINTMENT (OUTPATIENT)
Dept: PLASTIC SURGERY | Facility: CLINIC | Age: 29
End: 2019-01-22
Payer: COMMERCIAL

## 2019-01-22 PROCEDURE — 99212 OFFICE O/P EST SF 10 MIN: CPT

## 2019-01-22 NOTE — REASON FOR VISIT
[Post Op: _________] : a [unfilled] post op visit [FreeTextEntry1] : DOP: 12/18/2018 s/p excisional bx and closure of Right eyelid mass. Patient states she is doing well; has been massaging area with Scarless MD.

## 2019-01-24 ENCOUNTER — APPOINTMENT (OUTPATIENT)
Dept: PLASTIC SURGERY | Facility: CLINIC | Age: 29
End: 2019-01-24

## 2019-02-19 ENCOUNTER — APPOINTMENT (OUTPATIENT)
Dept: PLASTIC SURGERY | Facility: CLINIC | Age: 29
End: 2019-02-19
Payer: COMMERCIAL

## 2019-02-19 ENCOUNTER — LABORATORY RESULT (OUTPATIENT)
Age: 29
End: 2019-02-19

## 2019-02-19 PROCEDURE — 13131 CMPLX RPR F/C/C/M/N/AX/G/H/F: CPT | Mod: 59

## 2019-02-19 PROCEDURE — 21555 EXC NECK LES SC < 3 CM: CPT

## 2019-02-19 NOTE — REASON FOR VISIT
[Procedure: _________] : a [unfilled] procedure visit [FreeTextEntry1] : Patient presents to the office today for excisional bx and closure of Left lower neck mass.

## 2019-02-19 NOTE — PROCEDURE
[FreeTextEntry6] : Preopdx:  neck  mass\par Procedure: excisional biopsy  1.2 cm, and complex closure 1.2 cm neck\par Anesthesia: local 1% w/epi\par Specimens: to path on formalin\par No complications\par \par Summary:\par IC obtained.  Lesion demarcated with marking pen.  1%lido with epinephrine injected.  15 blade used to incise full thickness.    1.2Cm  lesion excised in subcutaneous plane.   Hemostasis obtained with cautery.  Skin edges widely undermined and closed for a complex closure of  1.2 cm.  bacitracin and steristrips placed.  \par \par \par \par

## 2019-02-21 ENCOUNTER — APPOINTMENT (OUTPATIENT)
Dept: PLASTIC SURGERY | Facility: CLINIC | Age: 29
End: 2019-02-21

## 2019-02-26 ENCOUNTER — APPOINTMENT (OUTPATIENT)
Dept: PLASTIC SURGERY | Facility: CLINIC | Age: 29
End: 2019-02-26
Payer: COMMERCIAL

## 2019-02-26 PROCEDURE — 99024 POSTOP FOLLOW-UP VISIT: CPT

## 2019-03-11 ENCOUNTER — LABORATORY RESULT (OUTPATIENT)
Age: 29
End: 2019-03-11

## 2019-03-12 ENCOUNTER — APPOINTMENT (OUTPATIENT)
Dept: PLASTIC SURGERY | Facility: CLINIC | Age: 29
End: 2019-03-12
Payer: COMMERCIAL

## 2019-03-12 PROCEDURE — 13131 CMPLX RPR F/C/C/M/N/AX/G/H/F: CPT | Mod: 79

## 2019-03-12 PROCEDURE — 11442 EXC FACE-MM B9+MARG 1.1-2 CM: CPT | Mod: 79

## 2019-03-12 NOTE — PROCEDURE
[FreeTextEntry6] : Preopdx:right cheek neurofibroma\par Procedure: excisional biopsy   1.1cm nevus of cheek mass and complex closure 1.1cm\par Anesthesia: local 1% w/epi\par Specimens: to path on formalin\par No complications\par \par Summary:\par IC obtained.  Lesion demarcated with marking pen.  1%lido with epinephrine injected.  15 blade used to incise full thickness.    1.1Cm  lesion excised as an ellipse full thickness in subcutaneous plane.   Hemostasis obtained with cautery.  Skin edges widely undermined and closed for a complex closure of  1.1cm.  bacitracin and steristrips placed.\par

## 2019-03-19 ENCOUNTER — APPOINTMENT (OUTPATIENT)
Dept: PLASTIC SURGERY | Facility: CLINIC | Age: 29
End: 2019-03-19
Payer: COMMERCIAL

## 2019-03-19 PROCEDURE — 99024 POSTOP FOLLOW-UP VISIT: CPT

## 2019-03-19 NOTE — HISTORY OF PRESENT ILLNESS
[FreeTextEntry1] : DOP 03/12/2019 s/p excisional bx and closure of Right perioral mass. Pt is doing well.\par No complaints. SIte healing well per pt. No excessive bleeding. No fevers. No odor. No purulent discharge. No excessive pain.\par \par Path c/w neurofibroma\par  \par

## 2019-04-23 ENCOUNTER — APPOINTMENT (OUTPATIENT)
Dept: PLASTIC SURGERY | Facility: CLINIC | Age: 29
End: 2019-04-23
Payer: COMMERCIAL

## 2019-04-23 ENCOUNTER — LABORATORY RESULT (OUTPATIENT)
Age: 29
End: 2019-04-23

## 2019-04-23 PROCEDURE — 21011 EXC FACE LES SC <2 CM: CPT | Mod: 79

## 2019-04-23 PROCEDURE — 13131 CMPLX RPR F/C/C/M/N/AX/G/H/F: CPT | Mod: 79,59

## 2019-04-23 NOTE — PROCEDURE
[FreeTextEntry6] : Preopdx:chin mass\par Procedure: excisional biopsy   1.5cm mass of chin, and complex closure 1.5cm chin\par Anesthesia: local 1% w/epi\par Specimens: to path on formalin\par No complications\par \par Summary:\par IC obtained.  Lesion demarcated with marking pen.  1%lido with epinephrine injected.  15 blade used to incise full thickness.    1.5Cm  lesion excised as an ellipse full thickness in subcutaneous plane.   Hemostasis obtained with cautery.  Skin edges widely undermined and closed for a complex closure of  1.5cm.  bacitracin and steristrips placed.  \par \par

## 2019-04-23 NOTE — REASON FOR VISIT
[Procedure: _________] : a [unfilled] procedure visit [FreeTextEntry1] : excisional bx and closure of chin mass.

## 2019-04-30 ENCOUNTER — APPOINTMENT (OUTPATIENT)
Dept: PLASTIC SURGERY | Facility: CLINIC | Age: 29
End: 2019-04-30
Payer: COMMERCIAL

## 2019-04-30 PROCEDURE — 99024 POSTOP FOLLOW-UP VISIT: CPT

## 2019-05-01 NOTE — HISTORY OF PRESENT ILLNESS
[FreeTextEntry1] : DOP: 04/23/2019 s/p excisional bx and closure of chin mass- neurofibroma most likely. Path still pending. Pt with h/o neurofibromatosis. \par No complaints. SIte healing well per pt. No excessive bleeding. No fevers. No odor. No purulent discharge. No excessive pain.\par \par Other sites of previous excisions healing well.

## 2019-05-21 ENCOUNTER — LABORATORY RESULT (OUTPATIENT)
Age: 29
End: 2019-05-21

## 2019-05-21 ENCOUNTER — APPOINTMENT (OUTPATIENT)
Dept: PLASTIC SURGERY | Facility: CLINIC | Age: 29
End: 2019-05-21
Payer: COMMERCIAL

## 2019-05-21 PROCEDURE — 13131 CMPLX RPR F/C/C/M/N/AX/G/H/F: CPT | Mod: 58,59

## 2019-05-21 PROCEDURE — 21011 EXC FACE LES SC <2 CM: CPT | Mod: 58

## 2019-05-21 NOTE — REASON FOR VISIT
[Procedure: _________] : a [unfilled] procedure visit [FreeTextEntry1] : Excisional biopsy and closure of chin mass.

## 2019-05-21 NOTE — PROCEDURE
[FreeTextEntry6] : Preop dx:neurofibroma, chin\par Postopdx: same\par Procedure: excision 1.1cm neurofibroma chin and complex closure of chin, 1.1cm\par Anesthesia: local 1% w/ epi\par Specimens: to path\par Procedure:\par 	IC obtained. Lesion demarcated.  Lido 1% w/ epi injected.  15 blade used to incise skin.  Lesion encountered in the subcutaneous plane and dissected circumferentially. Removed in its entirety, 1.1cm.  Skin edges widely undermined and closed in layers with monocryl for a 1.1cm complex closure. Mastisol and steristrips placed.  No complications.  Specimen sent to path\par \par

## 2019-05-30 ENCOUNTER — APPOINTMENT (OUTPATIENT)
Dept: PLASTIC SURGERY | Facility: CLINIC | Age: 29
End: 2019-05-30
Payer: COMMERCIAL

## 2019-05-30 PROCEDURE — 99024 POSTOP FOLLOW-UP VISIT: CPT

## 2019-05-30 NOTE — HISTORY OF PRESENT ILLNESS
[FreeTextEntry1] : s/p excisional biopsy and closure of additional chin mass DOP 05/21/19.\par Pt is doing better w/time, denies any pain. \par Here for suture removal. \par \par Likely neurofibroma- path still pending

## 2019-06-05 NOTE — H&P PST ADULT - MARITAL STATUS
FREE:[LAST:[[facility] practitioner],PHONE:[(   )    -],FAX:[(   )    -],FOLLOWUP:[1-3 days]] Single/no kids

## 2019-08-13 ENCOUNTER — APPOINTMENT (OUTPATIENT)
Dept: INTERNAL MEDICINE | Facility: CLINIC | Age: 29
End: 2019-08-13
Payer: COMMERCIAL

## 2019-08-13 VITALS
HEART RATE: 74 BPM | TEMPERATURE: 98.2 F | SYSTOLIC BLOOD PRESSURE: 114 MMHG | OXYGEN SATURATION: 98 % | RESPIRATION RATE: 14 BRPM | HEIGHT: 66 IN | BODY MASS INDEX: 24.43 KG/M2 | DIASTOLIC BLOOD PRESSURE: 70 MMHG | WEIGHT: 152 LBS

## 2019-08-13 PROCEDURE — 99395 PREV VISIT EST AGE 18-39: CPT | Mod: 25

## 2019-08-13 PROCEDURE — G0444 DEPRESSION SCREEN ANNUAL: CPT

## 2019-08-13 PROCEDURE — 36415 COLL VENOUS BLD VENIPUNCTURE: CPT

## 2019-08-13 NOTE — HISTORY OF PRESENT ILLNESS
[Health Maintenance] : health maintenance [Mother] : mother [Unmarried] : unmarried [Working Full Time] : working full time [Never Smoked Cigarettes] : has never smoked cigarettes [Occupation ___] : occupation: [unfilled] [Rare Use] : rare alcohol use [Never] : has never used illicit drugs [Reg. Dental Visits] : She has regular dental visits [Healthy Diet] : She consumes a diverse and healthy diet [Premenopausal] : the patient is premenopausal [Good] : good [Binge Drinking] : denies binge drinking [Patient Concern] : no personal concern about alcohol use [Family Concern] : no family concern about alcohol use [Vision Problems] : She denies vision problems [Hearing Loss] : She denies hearing loss [de-identified] : 6/18 [de-identified] : brother [de-identified] : hx flu shot 1/18, hx Tdap 2/17, hx hep B series, hx HPV series [FreeTextEntry1] : \par Feeling well, no complaints.\par Fasting.\par \par hx injury to right pointer finger 8/18- now asx, using w/o limitations\par -hx onset after wrestling with brother when accidentally over extended digit when his knee hit it.  N\par \par hx throat sx's- resolved\par -hx ? 2/2 shrimp and mustard reaction- remains resolved, avoids citrus and tomatoes also\par -has been eating shrimp w/o event but avoids mustard\par -hx eval by A/I 2/17 with negative food allergy testing, had lab testing and f/u 8/18 with no significant food allergies noted.  +testing to trees/weeds and grass\par -hx eval by ENT 3/17 with fiberoptic exam showing reflux, allergic rhinitis- is s/p 30 d trial of omeprazole with sx resolution and off PPI x few months. Remains asx. \par -denies sore throat, cough, sob or dysphagia\par -reports hx eating marinara sauce prior to initial sx onset, also was eating close to bedtime- avoids both now.\par \par hx neurofibromatosis- dx'd as infant\par -followed by neuro, seen 11/18 when advised MRI orbits for surveillance- pending.  Advised f/u in 1 yr. -hx negative screening brain MRI 9/17\par -followed by plastics- is s/p removal of multiple lesions path: neurofibromas, last at cheek in 5/19 with plastics f/u since noted healing well.  F/U pending, plans to be seen as needed.  \par -denies HA, dizziness, paresthesias, weakness or joint swelling\par \par preDM- \par -tries to eating healthy, cut out sweets/carbs \par -exercise: treadmill running 2 miles or cardio machines or strength training 2-3x/wk x 1 hr- done w/o sx's or limitations\par \par Reports nl appetite and BMs; denies GI complaints.  Wt stable.\par \par hx seasonal allergies- not active currently\par -no meds used, hx flonase OTC prn\par \par \par Not currently sexually active, no hx partner in past yr, condoms used regularly; denies hx STD dx\par -denies  complaints.\par \par Denies depression or anxiety.\par

## 2019-08-13 NOTE — PHYSICAL EXAM
[General Appearance - In No Acute Distress] : in no acute distress [General Appearance - Alert] : alert [General Appearance - Well-Appearing] : healthy appearing [Sclera] : the sclera and conjunctiva were normal [PERRL With Normal Accommodation] : pupils were equal in size, round, and reactive to light [Extraocular Movements] : extraocular movements were intact [Outer Ear] : the ears and nose were normal in appearance [Nasal Cavity] : the nasal mucosa and septum were normal [Oropharynx] : the oropharynx was normal [Neck Appearance] : the appearance of the neck was normal [] : the neck was supple [Thyroid Diffuse Enlargement] : the thyroid was not enlarged [Respiration, Rhythm And Depth] : normal respiratory rhythm and effort [Auscultation Breath Sounds / Voice Sounds] : lungs were clear to auscultation bilaterally [Heart Rate And Rhythm] : heart rate was normal and rhythm regular [Heart Sounds] : normal S1 and S2 [Murmurs] : no murmurs [Full Pulse] : the pedal pulses are present [Edema] : there was no peripheral edema [Bowel Sounds] : normal bowel sounds [Abdomen Soft] : soft [Abdomen Tenderness] : non-tender [Cervical Lymph Nodes Enlarged Posterior Bilaterally] : posterior cervical [Cervical Lymph Nodes Enlarged Anterior Bilaterally] : anterior cervical [Supraclavicular Lymph Nodes Enlarged Bilaterally] : supraclavicular [No CVA Tenderness] : no ~M costovertebral angle tenderness [No Spinal Tenderness] : no spinal tenderness [Abnormal Walk] : normal gait [Musculoskeletal - Swelling] : no joint swelling seen [No Focal Deficits] : no focal deficits [Oriented To Time, Place, And Person] : oriented to person, place, and time [Affect] : the affect was normal [Mood] : the mood was normal [FreeTextEntry1] : scattered freckles and skin lesions on face, trunk, abdomen, hands, legs.  Right thigh: +dressing, clean/dry and intact [Over the Past 2 Weeks, Have You Felt Down, Depressed, or Hopeless?] : 1.) Over the past 2 weeks, have you felt down, depressed, or hopeless? No [Over the Past 2 Weeks, Have You Felt Little Interest or Pleasure Doing Things?] : 2.) Over the past 2 weeks, have you felt little interest or pleasure doing things? No

## 2019-08-13 NOTE — HEALTH RISK ASSESSMENT
[0] : 2) Feeling down, depressed, or hopeless: Not at all (0) [Patient reported PAP Smear was normal] : Patient reported PAP Smear was normal [Smoke Detector] : smoke detector [Carbon Monoxide Detector] : carbon monoxide detector [Seat Belt] :  uses seat belt [Sunscreen] : uses sunscreen [HIV test declined] : HIV test declined [Guns at Home] : no guns at home [PapSmearDate] : 08/18 [HIVDate] : 06/18 [HIVComments] : negative [HepatitisCDate] : 06/18 [HepatitisCComments] : negative

## 2019-08-13 NOTE — PAST MEDICAL HISTORY
[Menstruating] : hx of menstruating [Menarche Age ____] : age at menarche was [unfilled] [Normal Duration] : the duration was normal [Regular Cycle Intervals] : have been regular [Total Preg ___] : G: [unfilled] [Definite ___ (Date)] : the last menstrual period was [unfilled]

## 2019-08-13 NOTE — ASSESSMENT
[FreeTextEntry1] : \par preDM- 6/18 A1c 5.6 (was 5.9 prior)\par -cont ADA diet and exercise advised\par -check A1c \par \par neurofibromatosis- since infancy, s/p multiple neurofibroma excisions, last 5/19 (cheek mass)\par -followed by neuro, seen 11/18.  F/U pending.\par -hx negative MRI brain 9/17, awaiting MRI of orbits per neuro\par -optho referral for screening, asx.\par -followed by derm, seen 9/17\par -followed by plastic surgery, seen 5/19 noted healing well ast surgical site\par \par hx throat sx's- resolved, ? 2/2 food allergy (mustard/tomatoe) vs GERD- resolved\par -eating shrimp since incident w/o any SEs reported\par -hx eval by A/I 2/17 with negative food allergy testing, had lab testing and f/u 8/18 with no significant food allergies noted.  +testing to trees/weeds and grass\par -seen by ENT 3/17 with fiberoptic exam showing reflux, allergic rhinitis- is s/p 30d trial of omeprazole with sx resolution and off PPI x few months.  F/U pending, encouraged if allergy testing negative.\par -hx OTC PPI prn\par -advised to cont. avoidance of reflux causing foods and meals close to bedtime\par -declines epi pen\par -advised ER visit if any onset of facial/tongue swelling, rash or sob with eating food.\par \par allergic rhinitis- asx\par -on flonase prn OTC\par -advised OTC antihistamine (ie claritin) prn\par \par hx finger pain- right pointer, s/p min trauma- resolved, asx\par \par \par HCM\par -check screening labs; declines HIV/STD screening\par -STD and pregnancy prevention with regular use of condoms counseled\par -hx flu shot 1/18, yearly advised\par -hx Tdap 2/17\par -hx hep B series, immune per 2/17 labs\par -hx HPV series\par -hx negative PAP 8/18 GYN per pt\par -derm referral for screening.  Regular use of sun block for skin cancer prevention counseled.\par -optho referral for screening.  Asx.\par \par \par Pt's cell: [phone]\par Pt declines f/u appt, yearly CPE and f/u as needed advised.

## 2019-08-16 LAB
25(OH)D3 SERPL-MCNC: 20.3 NG/ML
ALBUMIN SERPL ELPH-MCNC: 4.5 G/DL
ALP BLD-CCNC: 57 U/L
ALT SERPL-CCNC: 10 U/L
ANION GAP SERPL CALC-SCNC: 14 MMOL/L
AST SERPL-CCNC: 15 U/L
BASOPHILS # BLD AUTO: 0.04 K/UL
BASOPHILS NFR BLD AUTO: 0.6 %
BILIRUB SERPL-MCNC: 0.5 MG/DL
BUN SERPL-MCNC: 12 MG/DL
CALCIUM SERPL-MCNC: 9.6 MG/DL
CHLORIDE SERPL-SCNC: 106 MMOL/L
CHOLEST SERPL-MCNC: 147 MG/DL
CHOLEST/HDLC SERPL: 2.1 RATIO
CO2 SERPL-SCNC: 21 MMOL/L
CREAT SERPL-MCNC: 0.61 MG/DL
EOSINOPHIL # BLD AUTO: 0.03 K/UL
EOSINOPHIL NFR BLD AUTO: 0.5 %
ESTIMATED AVERAGE GLUCOSE: 111 MG/DL
GLUCOSE SERPL-MCNC: 93 MG/DL
HBA1C MFR BLD HPLC: 5.5 %
HCT VFR BLD CALC: 41.2 %
HDLC SERPL-MCNC: 69 MG/DL
HGB BLD-MCNC: 13 G/DL
IMM GRANULOCYTES NFR BLD AUTO: 0.2 %
LDLC SERPL CALC-MCNC: 69 MG/DL
LYMPHOCYTES # BLD AUTO: 1.54 K/UL
LYMPHOCYTES NFR BLD AUTO: 24.5 %
MAN DIFF?: NORMAL
MCHC RBC-ENTMCNC: 29.2 PG
MCHC RBC-ENTMCNC: 31.6 GM/DL
MCV RBC AUTO: 92.6 FL
MONOCYTES # BLD AUTO: 0.45 K/UL
MONOCYTES NFR BLD AUTO: 7.2 %
NEUTROPHILS # BLD AUTO: 4.21 K/UL
NEUTROPHILS NFR BLD AUTO: 67 %
PLATELET # BLD AUTO: 308 K/UL
POTASSIUM SERPL-SCNC: 4.7 MMOL/L
PROT SERPL-MCNC: 7.4 G/DL
RBC # BLD: 4.45 M/UL
RBC # FLD: 13.9 %
SODIUM SERPL-SCNC: 141 MMOL/L
TRIGL SERPL-MCNC: 46 MG/DL
TSH SERPL-ACNC: 0.94 UIU/ML
WBC # FLD AUTO: 6.28 K/UL

## 2019-09-13 ENCOUNTER — APPOINTMENT (OUTPATIENT)
Dept: OTOLARYNGOLOGY | Facility: CLINIC | Age: 29
End: 2019-09-13
Payer: COMMERCIAL

## 2019-09-13 VITALS
WEIGHT: 147 LBS | BODY MASS INDEX: 23.63 KG/M2 | SYSTOLIC BLOOD PRESSURE: 111 MMHG | HEIGHT: 66 IN | DIASTOLIC BLOOD PRESSURE: 71 MMHG | HEART RATE: 77 BPM | OXYGEN SATURATION: 98 %

## 2019-09-13 PROCEDURE — 99204 OFFICE O/P NEW MOD 45 MIN: CPT

## 2019-09-13 NOTE — ASSESSMENT
[FreeTextEntry1] : The patient is a 28-year-old female who presents with a 2 week history of right-sided ear discomfort. She states that this is an itch or tickle that come and go. History and physical exam are essentially normal.  Right EAC appears healthy.  I'm not sure why this is happening exactly.  Could be related to allergy vs intermittent q-tip use, vs eustachian tube issues.  I recommended stopping q-tip use as well as otic steroid drops as needed.  She can follow up in 2 weeks if symptoms return.\par \par - otic steroid solution as needed\par - q-tip avoidance\par - f/u 2 week if symptoms don't improve.

## 2019-09-13 NOTE — HISTORY OF PRESENT ILLNESS
[de-identified] : The patient presents with a 2 week history of right-sided ear discomfort. She states it feels like there is a tickle or itching in her right ear. She states that this will come and go. Yesterday she did have the symptoms at all. Today she noted that she had this for a few moments. Overall she denies any problems with hearing, tinnitus, vertigo, otorrhea or otalgia. She denies any ear, nose, throat symptoms otherwise.\par \par Of note the patient is concerned that there is a foreign object in her ear and has some anxiety about it.  She does intermittently use q-tips.

## 2019-09-24 ENCOUNTER — NON-APPOINTMENT (OUTPATIENT)
Age: 29
End: 2019-09-24

## 2019-09-24 ENCOUNTER — APPOINTMENT (OUTPATIENT)
Dept: OPHTHALMOLOGY | Facility: CLINIC | Age: 29
End: 2019-09-24
Payer: COMMERCIAL

## 2019-09-24 PROCEDURE — 92004 COMPRE OPH EXAM NEW PT 1/>: CPT

## 2019-10-07 ENCOUNTER — APPOINTMENT (OUTPATIENT)
Dept: DERMATOLOGY | Facility: CLINIC | Age: 29
End: 2019-10-07
Payer: COMMERCIAL

## 2019-10-07 DIAGNOSIS — Z12.83 ENCOUNTER FOR SCREENING FOR MALIGNANT NEOPLASM OF SKIN: ICD-10-CM

## 2019-10-07 PROCEDURE — 99213 OFFICE O/P EST LOW 20 MIN: CPT | Mod: GC

## 2019-10-30 ENCOUNTER — RX RENEWAL (OUTPATIENT)
Age: 29
End: 2019-10-30

## 2019-11-08 ENCOUNTER — APPOINTMENT (OUTPATIENT)
Dept: INTERNAL MEDICINE | Facility: CLINIC | Age: 29
End: 2019-11-08
Payer: COMMERCIAL

## 2019-11-08 VITALS
DIASTOLIC BLOOD PRESSURE: 72 MMHG | TEMPERATURE: 98.9 F | WEIGHT: 147 LBS | HEIGHT: 66 IN | HEART RATE: 67 BPM | OXYGEN SATURATION: 98 % | SYSTOLIC BLOOD PRESSURE: 114 MMHG | BODY MASS INDEX: 23.63 KG/M2

## 2019-11-08 PROCEDURE — 93000 ELECTROCARDIOGRAM COMPLETE: CPT

## 2019-11-08 PROCEDURE — 99214 OFFICE O/P EST MOD 30 MIN: CPT | Mod: 25

## 2019-11-13 ENCOUNTER — TRANSCRIPTION ENCOUNTER (OUTPATIENT)
Age: 29
End: 2019-11-13

## 2019-11-13 LAB
ALBUMIN SERPL ELPH-MCNC: 4.3 G/DL
ALP BLD-CCNC: 64 U/L
ALT SERPL-CCNC: 10 U/L
ANION GAP SERPL CALC-SCNC: 13 MMOL/L
AST SERPL-CCNC: 16 U/L
BASOPHILS # BLD AUTO: 0.06 K/UL
BASOPHILS NFR BLD AUTO: 0.7 %
BILIRUB SERPL-MCNC: 0.4 MG/DL
BUN SERPL-MCNC: 7 MG/DL
CALCIUM SERPL-MCNC: 9.6 MG/DL
CHLORIDE SERPL-SCNC: 105 MMOL/L
CO2 SERPL-SCNC: 24 MMOL/L
CREAT SERPL-MCNC: 0.57 MG/DL
EOSINOPHIL # BLD AUTO: 0.03 K/UL
EOSINOPHIL NFR BLD AUTO: 0.4 %
GLUCOSE SERPL-MCNC: 90 MG/DL
HCT VFR BLD CALC: 39.7 %
HGB BLD-MCNC: 12.5 G/DL
IMM GRANULOCYTES NFR BLD AUTO: 0.4 %
LYMPHOCYTES # BLD AUTO: 1.62 K/UL
LYMPHOCYTES NFR BLD AUTO: 19.3 %
MAN DIFF?: NORMAL
MCHC RBC-ENTMCNC: 29.5 PG
MCHC RBC-ENTMCNC: 31.5 GM/DL
MCV RBC AUTO: 93.6 FL
MONOCYTES # BLD AUTO: 0.43 K/UL
MONOCYTES NFR BLD AUTO: 5.1 %
NEUTROPHILS # BLD AUTO: 6.23 K/UL
NEUTROPHILS NFR BLD AUTO: 74.1 %
PLATELET # BLD AUTO: 381 K/UL
POTASSIUM SERPL-SCNC: 4.3 MMOL/L
PROT SERPL-MCNC: 7.4 G/DL
RBC # BLD: 4.24 M/UL
RBC # FLD: 13.4 %
SODIUM SERPL-SCNC: 142 MMOL/L
WBC # FLD AUTO: 8.4 K/UL

## 2019-11-13 NOTE — PHYSICAL EXAM
[No Carotid Bruits] : no carotid bruits [No Edema] : there was no peripheral edema [Normal] : no joint swelling and grossly normal strength and tone [de-identified] : multiple fibromas

## 2019-11-13 NOTE — HISTORY OF PRESENT ILLNESS
[No Pertinent Cardiac History] : no history of aortic stenosis, atrial fibrillation, coronary artery disease, recent myocardial infarction, or implantable device/pacemaker [No Adverse Anesthesia Reaction] : no adverse anesthesia reaction in self or family member [No Pertinent Pulmonary History] : no history of asthma, COPD, sleep apnea, or smoking [(Patient denies any chest pain, claudication, dyspnea on exertion, orthopnea, palpitations or syncope)] : Patient denies any chest pain, claudication, dyspnea on exertion, orthopnea, palpitations or syncope [Chronic Anticoagulation] : no chronic anticoagulation [FreeTextEntry1] : neurofibroma removal  [Chronic Kidney Disease] : no chronic kidney disease [Diabetes] : no diabetes [FreeTextEntry4] : 29 y.o. female with h/o neurofibromatosis, prediabetes, GERD.\par Surveillance brain MRI last done 2017 - normal. \par Will be getting multiple neurofibroma removed on torso and back via electrodesiccation.  Past resections of large neurofibroma removed on the right thigh as well as several on the face.  Thigh surgery c/b hematoma.  \par 2-3x/week works with a  - cardio and strengthening.  Feels good with activity.  \par Overall feeling well, no headaches, no dizziness, no chest pain, no palpitations, no dyspnea, no abdominal pain, no fevers.  GERD has resolved with dietary modification. \par  [FreeTextEntry2] : 11/20/19 [FreeTextEntry3] : Dr. Thee Jessica

## 2019-11-13 NOTE — ASSESSMENT
[High Risk Surgery - Intraperitoneal, Intrathoracic or Supringuinal Vascular Procedures] : High Risk Surgery - Intraperitoneal, Intrathoracic or Supringuinal Vascular Procedures - No (0) [Ischemic Heart Disease] : Ischemic Heart Disease - No (0) [Congestive Heart Failure] : Congestive Heart Failure - No (0) [Prior Cerebrovascular Accident or TIA] : Prior Cerebrovascular Accident or TIA - No (0) [Creatinine >= 2mg/dL (1 Point)] : Creatinine >= 2mg/dL - No (0) [Insulin-dependent Diabetic (1 Point)] : Insulin-dependent Diabetic - No (0) [Patient Optimized for Surgery] : Patient optimized for surgery [0] : 0 , RCRI Class: I, Risk of Post-Op Cardiac Complications: 0.4%, Procedure Risk: Low-Risk [As per surgery] : as per surgery [FreeTextEntry4] : No medical contraindication to surgery, labs pending.

## 2019-11-13 NOTE — REVIEW OF SYSTEMS
[Chills] : no chills [Fever] : no fever [Earache] : no earache [Night Sweats] : no night sweats [Fatigue] : no fatigue [Nasal Discharge] : no nasal discharge [Sore Throat] : no sore throat [Chest Pain] : no chest pain [Postnasal Drip] : no postnasal drip [Orthopnea] : no orthopnea [Palpitations] : no palpitations [Shortness Of Breath] : no shortness of breath [Lower Ext Edema] : no lower extremity edema [Cough] : no cough [Wheezing] : no wheezing [Nausea] : no nausea [Constipation] : no constipation [Abdominal Pain] : no abdominal pain [Diarrhea] : diarrhea [Vomiting] : no vomiting [Dysuria] : no dysuria [Heartburn] : no heartburn [Joint Pain] : no joint pain [Joint Swelling] : no joint swelling [Joint Stiffness] : no joint stiffness [Headache] : no headache [Back Pain] : no back pain [Muscle Weakness] : no muscle weakness [Dizziness] : no dizziness [Confusion] : no confusion [Fainting] : no fainting [Easy Bleeding] : no easy bleeding [Swollen Glands] : no swollen glands [Easy Bruising] : no easy bruising

## 2020-02-03 NOTE — PAST MEDICAL HISTORY
Is This A New Presentation, Or A Follow-Up?: Skin Lesions [Menarche Age ____] : age at menarche was [unfilled] [Normal Duration] : the duration was normal [Regular Cycle Intervals] : have been regular [Total Preg ___] : G: [unfilled]

## 2020-12-28 ENCOUNTER — NON-APPOINTMENT (OUTPATIENT)
Age: 30
End: 2020-12-28

## 2021-01-15 ENCOUNTER — APPOINTMENT (OUTPATIENT)
Dept: INTERNAL MEDICINE | Facility: CLINIC | Age: 31
End: 2021-01-15
Payer: COMMERCIAL

## 2021-01-15 VITALS
DIASTOLIC BLOOD PRESSURE: 70 MMHG | HEART RATE: 98 BPM | WEIGHT: 154 LBS | OXYGEN SATURATION: 98 % | BODY MASS INDEX: 24.75 KG/M2 | SYSTOLIC BLOOD PRESSURE: 126 MMHG | HEIGHT: 66 IN | TEMPERATURE: 99.2 F

## 2021-01-15 DIAGNOSIS — Z01.818 ENCOUNTER FOR OTHER PREPROCEDURAL EXAMINATION: ICD-10-CM

## 2021-01-15 DIAGNOSIS — L29.9 PRURITUS, UNSPECIFIED: ICD-10-CM

## 2021-01-15 PROCEDURE — 99072 ADDL SUPL MATRL&STAF TM PHE: CPT

## 2021-01-15 PROCEDURE — G0008: CPT

## 2021-01-15 PROCEDURE — 99395 PREV VISIT EST AGE 18-39: CPT | Mod: 25

## 2021-01-15 PROCEDURE — 90686 IIV4 VACC NO PRSV 0.5 ML IM: CPT

## 2021-01-15 RX ORDER — MOMETASONE FUROATE 1 MG/ML
0.1 SOLUTION TOPICAL
Qty: 1 | Refills: 0 | Status: DISCONTINUED | COMMUNITY
Start: 2019-09-13 | End: 2021-01-15

## 2021-01-15 RX ORDER — ERGOCALCIFEROL 1.25 MG/1
1.25 MG CAPSULE, LIQUID FILLED ORAL
Qty: 8 | Refills: 0 | Status: DISCONTINUED | COMMUNITY
Start: 2019-08-16 | End: 2021-01-15

## 2021-01-19 LAB
25(OH)D3 SERPL-MCNC: 26.5 NG/ML
ALBUMIN SERPL ELPH-MCNC: 4.5 G/DL
ALP BLD-CCNC: 67 U/L
ALT SERPL-CCNC: 11 U/L
ANION GAP SERPL CALC-SCNC: 13 MMOL/L
AST SERPL-CCNC: 16 U/L
BASOPHILS # BLD AUTO: 0.04 K/UL
BASOPHILS NFR BLD AUTO: 0.7 %
BILIRUB SERPL-MCNC: 0.4 MG/DL
BUN SERPL-MCNC: 10 MG/DL
CALCIUM SERPL-MCNC: 9.7 MG/DL
CHLORIDE SERPL-SCNC: 105 MMOL/L
CHOLEST SERPL-MCNC: 150 MG/DL
CO2 SERPL-SCNC: 22 MMOL/L
CREAT SERPL-MCNC: 0.76 MG/DL
EOSINOPHIL # BLD AUTO: 0.07 K/UL
EOSINOPHIL NFR BLD AUTO: 1.2 %
ESTIMATED AVERAGE GLUCOSE: 103 MG/DL
FOLATE SERPL-MCNC: 19.9 NG/ML
GLUCOSE SERPL-MCNC: 86 MG/DL
HBA1C MFR BLD HPLC: 5.2 %
HCT VFR BLD CALC: 43.1 %
HDLC SERPL-MCNC: 64 MG/DL
HGB BLD-MCNC: 14.1 G/DL
IMM GRANULOCYTES NFR BLD AUTO: 0.2 %
LDLC SERPL CALC-MCNC: 77 MG/DL
LYMPHOCYTES # BLD AUTO: 1.87 K/UL
LYMPHOCYTES NFR BLD AUTO: 33 %
MAN DIFF?: NORMAL
MCHC RBC-ENTMCNC: 31.3 PG
MCHC RBC-ENTMCNC: 32.7 GM/DL
MCV RBC AUTO: 95.6 FL
MONOCYTES # BLD AUTO: 0.48 K/UL
MONOCYTES NFR BLD AUTO: 8.5 %
NEUTROPHILS # BLD AUTO: 3.19 K/UL
NEUTROPHILS NFR BLD AUTO: 56.4 %
NONHDLC SERPL-MCNC: 85 MG/DL
PLATELET # BLD AUTO: 334 K/UL
POTASSIUM SERPL-SCNC: 4.2 MMOL/L
PROT SERPL-MCNC: 7.3 G/DL
RBC # BLD: 4.51 M/UL
RBC # FLD: 11.9 %
SARS-COV-2 IGG SERPL IA-ACNC: 0.08 INDEX
SARS-COV-2 IGG SERPL QL IA: NEGATIVE
SODIUM SERPL-SCNC: 140 MMOL/L
TRIGL SERPL-MCNC: 40 MG/DL
TSH SERPL-ACNC: 1.21 UIU/ML
VIT B12 SERPL-MCNC: 764 PG/ML
WBC # FLD AUTO: 5.66 K/UL

## 2021-01-19 NOTE — HEALTH RISK ASSESSMENT
[Good] : ~his/her~  mood as  good [No] : In the past 12 months have you used drugs other than those required for medical reasons? No [0] : 2) Feeling down, depressed, or hopeless: Not at all (0) [Patient reported PAP Smear was normal] : Patient reported PAP Smear was normal [With Family] : lives with family [Employed] : employed [Single] : single [Smoke Detector] : smoke detector [Carbon Monoxide Detector] : carbon monoxide detector [] : No [de-identified] : Tries to exercise at least twice a week, had been doing more utnil recent months.  [de-identified] : healthy [EML3Yemid] : 0 [Sexually Active] : not sexually active [Reports changes in hearing] : Reports no changes in hearing [Reports changes in vision] : Reports no changes in vision [Reports changes in dental health] : Reports no changes in dental health [Guns at Home] : no guns at home [PapSmearDate] : 8/2019

## 2021-01-19 NOTE — PHYSICAL EXAM
[No Carotid Bruits] : no carotid bruits [No Edema] : there was no peripheral edema [de-identified] : scattered neurofibromas  [Normal] : affect was normal and insight and judgment were intact

## 2021-01-19 NOTE — HISTORY OF PRESENT ILLNESS
[FreeTextEntry1] : CPE/wellness visit [de-identified] : 29 y.o. female with h/o neurofibromatosis, prediabetes, GERD.\par \par Feeling well, no concerns offered.  \par Due for surveillance MRI, has neuro scheduled.\par \par Would like flu shot and labs today.

## 2021-01-19 NOTE — ASSESSMENT
[FreeTextEntry1] : NF1:\par Follows with neuro, will be getting f/u surveillance MRI\par Shoulders slight malaligned on exam, will get x-ray to evaluate for scoliosis\par Guidelines recommend starting breast ca screening at age 29 with mammo and breast MRI, patient agreeable.  \par \par HM:\par UTD pap\par UTD dental\par UTD skin screening\par UTD tdap\par flu shot today\par check labs\par

## 2021-01-28 ENCOUNTER — APPOINTMENT (OUTPATIENT)
Dept: NEUROLOGY | Facility: CLINIC | Age: 31
End: 2021-01-28
Payer: COMMERCIAL

## 2021-01-28 VITALS
SYSTOLIC BLOOD PRESSURE: 130 MMHG | DIASTOLIC BLOOD PRESSURE: 81 MMHG | HEART RATE: 108 BPM | HEIGHT: 66 IN | BODY MASS INDEX: 24.75 KG/M2 | WEIGHT: 154 LBS

## 2021-01-28 PROCEDURE — 99072 ADDL SUPL MATRL&STAF TM PHE: CPT

## 2021-01-28 PROCEDURE — 99214 OFFICE O/P EST MOD 30 MIN: CPT

## 2021-01-28 NOTE — DISCUSSION/SUMMARY
[FreeTextEntry1] : 30-year-old woman who is here for follow-up of her NF1.  Patient will obtain MRI of the orbit with and without gadolinium.  Patient was advised that if this shows no acute findings there is no need for regular surveillance imaging.  She was agreeable to the plan.  She will follow-up with me in 1 year\par \par I spent the time noted on the day of this patient encounter preparing for, providing and documenting the above E/M service and counseling and educate patient on differential, workup, disease course, and treatment/management. Education was provided to the patient during this encounter. All questions and concerns were answered and addressed in detail. The patient verbalized understanding and agreed to plan. Patient was advised to continue to monitor for neurologic symptoms and to notify my office or go to the nearest emergency room if there are any changes. Any orders/referrals and communications were provided as well. \par Side effects of the above medications were discussed in detail including but not limited to applicable black box warning and teratogenicity as appropriate. \par Patient was advised to bring previous records to my office. \par \par \par

## 2021-01-28 NOTE — PHYSICAL EXAM
[General Appearance - Alert] : alert [General Appearance - Well Developed] : well developed [Oriented To Time, Place, And Person] : oriented to person, place, and time [Person] : oriented to person [Place] : oriented to place [Time] : oriented to time [Short Term Intact] : short term memory intact [Span Intact] : the attention span was normal [Naming Objects] : no difficulty naming common objects [Fluency] : fluency intact [Current Events] : adequate knowledge of current events [Cranial Nerves Optic (II)] : visual acuity intact bilaterally,  visual fields full to confrontation, pupils equal round and reactive to light [Cranial Nerves Oculomotor (III)] : extraocular motion intact [Cranial Nerves Trigeminal (V)] : facial sensation intact symmetrically [Cranial Nerves Facial (VII)] : face symmetrical [Cranial Nerves Vestibulocochlear (VIII)] : hearing was intact bilaterally [Cranial Nerves Glossopharyngeal (IX)] : tongue and palate midline [Cranial Nerves Accessory (XI - Cranial And Spinal)] : head turning and shoulder shrug symmetric [Cranial Nerves Hypoglossal (XII)] : there was no tongue deviation with protrusion [Motor Tone] : muscle tone was normal in all four extremities [Motor Strength] : muscle strength was normal in all four extremities [Sensation Tactile Decrease] : light touch was intact [Sensation Pain / Temperature Decrease] : pain and temperature was intact [Abnormal Walk] : normal gait [Coordination - Dysmetria Impaired Finger-to-Nose Bilateral] : not present [2+] : Patella left 2+ [FreeTextEntry5] : Visual fields full

## 2021-01-28 NOTE — HISTORY OF PRESENT ILLNESS
[FreeTextEntry1] : 30 W who is here for initial consultation of NF1. She does not know her family history as she's adopted. She had no history of seizures, optic glioma. There's neurofibromas all over her body. The last time she had and MRI of brain was when she was in high school and it was normal. 1992 mri of brain was unremarkable. She currently has no symptoms. She has upcoming ophthalmology and dermatology appts. \par \par interval history: she is seeing plastics for removal of neurofibroma. She has had no seizures or vision changes. Her last mRI in 2017 showed no acute changes. \par \par Interval history: Patient has had no seizures or vision changes.  Patient's last MRI in 2017 showed no acute changes.

## 2021-02-06 ENCOUNTER — APPOINTMENT (OUTPATIENT)
Dept: RADIOLOGY | Facility: IMAGING CENTER | Age: 31
End: 2021-02-06
Payer: COMMERCIAL

## 2021-02-06 ENCOUNTER — OUTPATIENT (OUTPATIENT)
Dept: OUTPATIENT SERVICES | Facility: HOSPITAL | Age: 31
LOS: 1 days | End: 2021-02-06
Payer: COMMERCIAL

## 2021-02-06 DIAGNOSIS — Z00.8 ENCOUNTER FOR OTHER GENERAL EXAMINATION: ICD-10-CM

## 2021-02-06 DIAGNOSIS — Q85.00 NEUROFIBROMATOSIS, UNSPECIFIED: ICD-10-CM

## 2021-02-06 PROCEDURE — 72082 X-RAY EXAM ENTIRE SPI 2/3 VW: CPT | Mod: 26

## 2021-02-06 PROCEDURE — 72082 X-RAY EXAM ENTIRE SPI 2/3 VW: CPT

## 2021-02-20 ENCOUNTER — RESULT REVIEW (OUTPATIENT)
Age: 31
End: 2021-02-20

## 2021-02-20 ENCOUNTER — OUTPATIENT (OUTPATIENT)
Dept: OUTPATIENT SERVICES | Facility: HOSPITAL | Age: 31
LOS: 1 days | End: 2021-02-20
Payer: COMMERCIAL

## 2021-02-20 ENCOUNTER — APPOINTMENT (OUTPATIENT)
Dept: ORTHOPEDIC SURGERY | Facility: CLINIC | Age: 31
End: 2021-02-20
Payer: COMMERCIAL

## 2021-02-20 ENCOUNTER — APPOINTMENT (OUTPATIENT)
Dept: MRI IMAGING | Facility: IMAGING CENTER | Age: 31
End: 2021-02-20
Payer: COMMERCIAL

## 2021-02-20 VITALS — BODY MASS INDEX: 24.75 KG/M2 | HEIGHT: 66 IN | WEIGHT: 154 LBS

## 2021-02-20 DIAGNOSIS — Q85.00 NEUROFIBROMATOSIS, UNSPECIFIED: ICD-10-CM

## 2021-02-20 DIAGNOSIS — Z00.8 ENCOUNTER FOR OTHER GENERAL EXAMINATION: ICD-10-CM

## 2021-02-20 DIAGNOSIS — Z00.00 ENCOUNTER FOR GENERAL ADULT MEDICAL EXAMINATION WITHOUT ABNORMAL FINDINGS: ICD-10-CM

## 2021-02-20 PROCEDURE — A9585: CPT

## 2021-02-20 PROCEDURE — 77049 MRI BREAST C-+ W/CAD BI: CPT | Mod: 26

## 2021-02-20 PROCEDURE — 99204 OFFICE O/P NEW MOD 45 MIN: CPT

## 2021-02-20 PROCEDURE — C8908: CPT

## 2021-02-20 PROCEDURE — C8937: CPT

## 2021-02-20 PROCEDURE — 99072 ADDL SUPL MATRL&STAF TM PHE: CPT

## 2021-02-20 NOTE — PHYSICAL EXAM
[de-identified] : Lumbar Physical Exam\par \par Gait -normal\par \par Station -slight asymmetry of the shoulders\par \par Sagittal balance -normal\par \par Compensatory mechanism? -None\par \par Heel walk -normal\par \par Toe walk -normal\par \par Reflexes\par Patellar -normal\par Gastroc -normal \par Clonus -no\par \par Hip Exam -normal\par \par Straight leg raise -none\par \par Pulses -2+ DP/PT\par \par Range of motion -normal\par \par Sensation \par Sensation intact to light touch in L1, L2, L3, L4, L5 and S1 dermatomes bilaterally\par \par Motor\par 	IP	Quad	HS	TA	Gastroc	EHL\par Right	5/5	5/5	5/5	5/5	5/5	5/5\par Left	5/5	5/5	5/5	5/5	5/5	5/5\par \par  [de-identified] : Scoliosis radiographs\par SVA within normal limits\par Approximately 22 degree thoracolumbar curve\par Coronal alignment adequate\par

## 2021-02-20 NOTE — ASSESSMENT
[FreeTextEntry1] : This is a 30-year-old female with a known history of neurofibromatosis type I the presents today for evaluation of her incidentally found scoliosis.  On my exam and on my interview I did not find any red flag symptoms.  I think the patient will do well without any further treatment of the scoliosis.  I encouraged her to follow-up sooner if she has any new back pain or radicular type symptoms.  All questions were answered.

## 2021-02-20 NOTE — HISTORY OF PRESENT ILLNESS
[de-identified] : This is a 30-year-old female with a known history of neurofibromatosis type I that presents today for evaluation of her incidentally found scoliosis.  She denies any back pain.  She denies any radicular pain down her arms or her legs.  She denies any neck pain.  She denies any issues with balance, denies any issues with hand dexterity.  She denies any bowel bladder issues.  She denies saddle anesthesia.  Overall she is a healthy 30-year-old without any complaints.  She did want to get her scoliosis checked.

## 2021-02-22 ENCOUNTER — NON-APPOINTMENT (OUTPATIENT)
Age: 31
End: 2021-02-22

## 2021-03-06 ENCOUNTER — OUTPATIENT (OUTPATIENT)
Dept: OUTPATIENT SERVICES | Facility: HOSPITAL | Age: 31
LOS: 1 days | End: 2021-03-06
Payer: COMMERCIAL

## 2021-03-06 ENCOUNTER — APPOINTMENT (OUTPATIENT)
Dept: MRI IMAGING | Facility: IMAGING CENTER | Age: 31
End: 2021-03-06
Payer: COMMERCIAL

## 2021-03-06 ENCOUNTER — RESULT REVIEW (OUTPATIENT)
Age: 31
End: 2021-03-06

## 2021-03-06 DIAGNOSIS — Q85.00 NEUROFIBROMATOSIS, UNSPECIFIED: ICD-10-CM

## 2021-03-06 DIAGNOSIS — Q85.09 OTHER NEUROFIBROMATOSIS: ICD-10-CM

## 2021-03-06 DIAGNOSIS — Z00.8 ENCOUNTER FOR OTHER GENERAL EXAMINATION: ICD-10-CM

## 2021-03-06 PROCEDURE — 70543 MRI ORBT/FAC/NCK W/O &W/DYE: CPT

## 2021-03-06 PROCEDURE — 70543 MRI ORBT/FAC/NCK W/O &W/DYE: CPT | Mod: 26

## 2021-03-06 PROCEDURE — A9585: CPT

## 2021-03-09 ENCOUNTER — NON-APPOINTMENT (OUTPATIENT)
Age: 31
End: 2021-03-09

## 2021-03-25 ENCOUNTER — APPOINTMENT (OUTPATIENT)
Dept: OPHTHALMOLOGY | Facility: CLINIC | Age: 31
End: 2021-03-25
Payer: COMMERCIAL

## 2021-03-25 ENCOUNTER — NON-APPOINTMENT (OUTPATIENT)
Age: 31
End: 2021-03-25

## 2021-03-25 PROCEDURE — 99072 ADDL SUPL MATRL&STAF TM PHE: CPT

## 2021-03-25 PROCEDURE — 92133 CPTRZD OPH DX IMG PST SGM ON: CPT

## 2021-03-25 PROCEDURE — 92014 COMPRE OPH EXAM EST PT 1/>: CPT

## 2021-04-13 ENCOUNTER — APPOINTMENT (OUTPATIENT)
Dept: INTERNAL MEDICINE | Facility: CLINIC | Age: 31
End: 2021-04-13

## 2021-05-18 ENCOUNTER — APPOINTMENT (OUTPATIENT)
Dept: OBGYN | Facility: CLINIC | Age: 31
End: 2021-05-18
Payer: COMMERCIAL

## 2021-05-18 VITALS
HEART RATE: 105 BPM | SYSTOLIC BLOOD PRESSURE: 128 MMHG | TEMPERATURE: 97.6 F | HEIGHT: 66 IN | BODY MASS INDEX: 24.59 KG/M2 | DIASTOLIC BLOOD PRESSURE: 86 MMHG | WEIGHT: 153 LBS

## 2021-05-18 PROCEDURE — 99385 PREV VISIT NEW AGE 18-39: CPT

## 2021-05-18 PROCEDURE — 99072 ADDL SUPL MATRL&STAF TM PHE: CPT

## 2021-05-21 LAB
CYTOLOGY CVX/VAG DOC THIN PREP: NORMAL
HPV HIGH+LOW RISK DNA PNL CVX: NOT DETECTED

## 2021-11-15 ENCOUNTER — NON-APPOINTMENT (OUTPATIENT)
Age: 31
End: 2021-11-15

## 2021-11-29 ENCOUNTER — APPOINTMENT (OUTPATIENT)
Dept: INTERNAL MEDICINE | Facility: CLINIC | Age: 31
End: 2021-11-29
Payer: COMMERCIAL

## 2021-11-29 VITALS
HEIGHT: 66 IN | WEIGHT: 156 LBS | TEMPERATURE: 98.3 F | HEART RATE: 99 BPM | OXYGEN SATURATION: 99 % | RESPIRATION RATE: 16 BRPM | SYSTOLIC BLOOD PRESSURE: 116 MMHG | DIASTOLIC BLOOD PRESSURE: 78 MMHG | BODY MASS INDEX: 25.07 KG/M2

## 2021-11-29 DIAGNOSIS — Z01.84 ENCOUNTER FOR ANTIBODY RESPONSE EXAMINATION: ICD-10-CM

## 2021-11-29 DIAGNOSIS — Z86.39 PERSONAL HISTORY OF OTHER ENDOCRINE, NUTRITIONAL AND METABOLIC DISEASE: ICD-10-CM

## 2021-11-29 PROCEDURE — 96127 BRIEF EMOTIONAL/BEHAV ASSMT: CPT

## 2021-11-29 PROCEDURE — 90686 IIV4 VACC NO PRSV 0.5 ML IM: CPT

## 2021-11-29 PROCEDURE — G0008: CPT

## 2021-11-29 PROCEDURE — 99213 OFFICE O/P EST LOW 20 MIN: CPT | Mod: 25

## 2021-11-29 RX ORDER — ACETAMINOPHEN 325 MG
TABLET ORAL
Refills: 0 | Status: DISCONTINUED | COMMUNITY
End: 2021-11-29

## 2021-11-29 RX ORDER — MULTIVITAMIN
CAPSULE ORAL
Refills: 0 | Status: DISCONTINUED | COMMUNITY
End: 2021-11-29

## 2021-11-29 RX ORDER — CHROMIUM 200 MCG
25 MCG TABLET ORAL
Refills: 0 | Status: ACTIVE | COMMUNITY

## 2021-11-29 NOTE — PHYSICAL EXAM
[General Appearance - Alert] : alert [General Appearance - In No Acute Distress] : in no acute distress [General Appearance - Well-Appearing] : healthy appearing [Sclera] : the sclera and conjunctiva were normal [PERRL With Normal Accommodation] : pupils were equal in size, round, and reactive to light [Extraocular Movements] : extraocular movements were intact [Outer Ear] : the ears and nose were normal in appearance [Nasal Cavity] : the nasal mucosa and septum were normal [Oropharynx] : the oropharynx was normal [Neck Appearance] : the appearance of the neck was normal [] : the neck was supple [Thyroid Diffuse Enlargement] : the thyroid was not enlarged [Respiration, Rhythm And Depth] : normal respiratory rhythm and effort [Auscultation Breath Sounds / Voice Sounds] : lungs were clear to auscultation bilaterally [Heart Rate And Rhythm] : heart rate was normal and rhythm regular [Heart Sounds] : normal S1 and S2 [Murmurs] : no murmurs [Full Pulse] : the pedal pulses are present [Edema] : there was no peripheral edema [Bowel Sounds] : normal bowel sounds [Abdomen Soft] : soft [Abdomen Tenderness] : non-tender [Cervical Lymph Nodes Enlarged Posterior Bilaterally] : posterior cervical [Cervical Lymph Nodes Enlarged Anterior Bilaterally] : anterior cervical [Supraclavicular Lymph Nodes Enlarged Bilaterally] : supraclavicular [No CVA Tenderness] : no ~M costovertebral angle tenderness [No Spinal Tenderness] : no spinal tenderness [Abnormal Walk] : normal gait [Musculoskeletal - Swelling] : no joint swelling seen [No Focal Deficits] : no focal deficits [Oriented To Time, Place, And Person] : oriented to person, place, and time [Affect] : the affect was normal [Mood] : the mood was normal [FreeTextEntry1] : scattered freckles and skin lesions on face, trunk, abdomen, hands, legs

## 2021-11-29 NOTE — HISTORY OF PRESENT ILLNESS
[FreeTextEntry1] : \par 32 yo F pmhx neurofibromatosis, scoliosis, allergic rhinitis, preDM, GERD for f/u\par \par Feeling well.\par \par Last seen by another provider 1/15/21 for CPE with labs done.\par Last seen by me 8/13/19 for f/u.\par \par Reports is socially distancing and using precautions for covid prevention.\par Denies sick or covid positive contacts.\par Denies fever, chills, cough or sob.\par -hx J&J vaccine 4/4/21\par \par denies hx mammo- states had screening breast MRI 1/21 ordered at CPE- states done and told benign\par denies breast pain, new lesions or nipple d/c\par unknown FH of breast cancer (adopted)\par \par hx neurofibromatosis- dx'd as infant\par -followed by neuro, seen 11/18 when advised MRI orbits and to f/u yearly\par -followed by plastics, last seen 2019 for excision of lesions\par -followed by optho, seen 3/21- denies vision issues\par -derm eval pending\par -denies HA, dizziness, paresthesias, weakness or joint swelling\par \par preDM- \par -tries to eating healthy, cut out sweets/carbs \par -exercise: treadmill running 2 miles or cardio machines 2-3x/wk x 1 hr- done w/o sx's or limitations\par \par Reports nl appetite and BMs; denies GI complaints.  Wt stable.\par \par hx seasonal allergies- not active currently\par -no meds used, hx Flonase OTC prn\par \par \par Denies  complaints.\par \par Denies depression or anxiety.\par

## 2021-11-29 NOTE — ASSESSMENT
[FreeTextEntry1] : \par 32 yo F pmhx neurofibromatosis, scoliosis, allergic rhinitis, preDM, GERD for f/u\par \par \par preDM- 1/21 A1c 5.2 (was 5.5 prior)\par -cont ADA diet and exercise advised\par \par neurofibromatosis- since infancy, s/p multiple neurofibroma excisions, last 11/19.  Asx.\par -followed by neuro, seen 1/21 with MRI orbits planned and to f/u annually.  \par -hx negative MRI brain 9/17\par -3/21 MRI of orbits: Normal appearance of the orbits, including the optic nerves. No orbital or intracranial lesion identified.  0.6 cm left temporal subcutaneous lesion again noted, likely representing neurofibroma\par -followed by optho, seen 3/21 for glaucoma suspect, NF and dry eyes with yearly screening advised\par -followed by derm, seen 9/17 - f/u advised\par -followed by plastic surgery\par \par scoliosis- asx\par -followed by ortho, seen 2/21 noted asx and advised no intervention, to f/u prn\par \par hx throat sx's- resolved, ? 2/2 food allergy (mustard/tomato) vs GERD- resolved\par -eating shrimp since incident w/o any SEs reported\par -hx eval by A/I 2/17 with negative food allergy testing, had lab testing and f/u 8/18 with no significant food allergies noted.  +testing to trees/weeds and grass\par -seen by ENT 3/17 with fiberoptic exam showing reflux, allergic rhinitis- is s/p 30d trial of omeprazole with sx resolution and off PPI x few months.  F/U pending, encouraged if allergy testing negative.\par -hx OTC PPI prn, now diet controlled (avoid citric foods)\par -advised to cont. avoidance of reflux causing foods and meals close to bedtime\par -declines epi pen\par -advised ER visit if any onset of facial/tongue swelling, rash or sob with eating food.\par \par vit d insuff-\par -on OTC supp\par \par MISC:  Continued social distancing and measure for covid19 prevention encouraged.  \par -hx J&J vaccine 4/4/21.  Booster advised.\par \par \par HCM\par -hx CPE 1/21, yearly advised\par -flu shot today\par -hx Tdap 2/17\par -hx hep B series, immune per 2/17 labs\par -hx HPV series\par -hx negative PAP/HPV 5/21 by GYN \par -hx screening breast MRI 2/21: benign, rec: repeat MRI in 1 yr - for Rx at next visit.  Asx.\par -yearly derm screening advised.  Regular use of sun block for skin cancer prevention counseled.\par \par Pt's cell: [phone]\par

## 2021-11-29 NOTE — HEALTH RISK ASSESSMENT
[0] : 2) Feeling down, depressed, or hopeless: Not at all (0) [PHQ-2 Negative - No further assessment needed] : PHQ-2 Negative - No further assessment needed [TNF2Jaizf] : 0

## 2022-01-12 PROBLEM — Z02.82 ADOPTED PERSON: Status: ACTIVE | Noted: 2022-01-12

## 2022-01-13 ENCOUNTER — APPOINTMENT (OUTPATIENT)
Dept: INTERNAL MEDICINE | Facility: CLINIC | Age: 32
End: 2022-01-13
Payer: COMMERCIAL

## 2022-01-13 VITALS
SYSTOLIC BLOOD PRESSURE: 112 MMHG | HEART RATE: 96 BPM | WEIGHT: 154 LBS | DIASTOLIC BLOOD PRESSURE: 74 MMHG | OXYGEN SATURATION: 84 % | HEIGHT: 66 IN | BODY MASS INDEX: 24.75 KG/M2 | RESPIRATION RATE: 16 BRPM | TEMPERATURE: 98.3 F

## 2022-01-13 DIAGNOSIS — Z02.82 ENCOUNTER FOR ADOPTION SERVICES: ICD-10-CM

## 2022-01-13 DIAGNOSIS — R09.89 OTHER SPECIFIED SYMPTOMS AND SIGNS INVOLVING THE CIRCULATORY AND RESPIRATORY SYSTEMS: ICD-10-CM

## 2022-01-13 DIAGNOSIS — Z86.018 PERSONAL HISTORY OF OTHER BENIGN NEOPLASM: ICD-10-CM

## 2022-01-13 DIAGNOSIS — Z81.3 FAMILY HISTORY OF OTHER PSYCHOACTIVE SUBSTANCE ABUSE AND DEPENDENCE: ICD-10-CM

## 2022-01-13 PROCEDURE — 99395 PREV VISIT EST AGE 18-39: CPT | Mod: 25

## 2022-01-13 PROCEDURE — 36415 COLL VENOUS BLD VENIPUNCTURE: CPT

## 2022-01-13 PROCEDURE — 96127 BRIEF EMOTIONAL/BEHAV ASSMT: CPT

## 2022-01-13 RX ORDER — FERROUS SULFATE 325(65) MG
325 (65 FE) TABLET ORAL
Refills: 0 | Status: DISCONTINUED | COMMUNITY
End: 2022-01-13

## 2022-01-13 NOTE — PHYSICAL EXAM
[General Appearance - Alert] : alert [General Appearance - In No Acute Distress] : in no acute distress [General Appearance - Well-Appearing] : healthy appearing [Sclera] : the sclera and conjunctiva were normal [PERRL With Normal Accommodation] : pupils were equal in size, round, and reactive to light [Extraocular Movements] : extraocular movements were intact [Outer Ear] : the ears and nose were normal in appearance [Nasal Cavity] : the nasal mucosa and septum were normal [Oropharynx] : the oropharynx was normal [Neck Appearance] : the appearance of the neck was normal [Thyroid Diffuse Enlargement] : the thyroid was not enlarged [Respiration, Rhythm And Depth] : normal respiratory rhythm and effort [Auscultation Breath Sounds / Voice Sounds] : lungs were clear to auscultation bilaterally [Heart Rate And Rhythm] : heart rate was normal and rhythm regular [Heart Sounds] : normal S1 and S2 [Murmurs] : no murmurs [Full Pulse] : the pedal pulses are present [Edema] : there was no peripheral edema [Bowel Sounds] : normal bowel sounds [Abdomen Soft] : soft [Abdomen Tenderness] : non-tender [Cervical Lymph Nodes Enlarged Posterior Bilaterally] : posterior cervical [Cervical Lymph Nodes Enlarged Anterior Bilaterally] : anterior cervical [Supraclavicular Lymph Nodes Enlarged Bilaterally] : supraclavicular [No CVA Tenderness] : no ~M costovertebral angle tenderness [No Spinal Tenderness] : no spinal tenderness [No Focal Deficits] : no focal deficits [Oriented To Time, Place, And Person] : oriented to person, place, and time [Affect] : the affect was normal [Mood] : the mood was normal [Both Tympanic Membranes Were Examined] : both tympanic membranes were normal [] : no hepato-splenomegaly [FreeTextEntry1] : scattered freckles and skin lesions on face, trunk, abdomen, hands, legs

## 2022-01-13 NOTE — HEALTH RISK ASSESSMENT
[0] : 2) Feeling down, depressed, or hopeless: Not at all (0) [Patient reported PAP Smear was normal] : Patient reported PAP Smear was normal [HIV test declined] : HIV test declined [Smoke Detector] : smoke detector [Seat Belt] :  uses seat belt [Sunscreen] : uses sunscreen [PHQ-2 Negative - No further assessment needed] : PHQ-2 Negative - No further assessment needed [Feels Safe at Home] : Feels safe at home [Carbon Monoxide Detector] : no carbon monoxide detector [Guns at Home] : no guns at home [PapSmearDate] : 05/21 [HIVDate] : 06/18 [HIVComments] : negative [HepatitisCDate] : 06/18 [HepatitisCComments] : negative

## 2022-01-13 NOTE — HISTORY OF PRESENT ILLNESS
[Health Maintenance] : health maintenance [Mother] : mother [Unmarried] : unmarried [Working Full Time] : working full time [Occupation ___] : occupation: [unfilled] [Never Smoked Cigarettes] : has never smoked cigarettes [Rare Use] : rare alcohol use [Never] : has never used illicit drugs [Good] : good [Reg. Dental Visits] : She has regular dental visits [Healthy Diet] : She consumes a diverse and healthy diet [Premenopausal] : the patient is premenopausal [Regular Exercise] : She exercises regularly [Binge Drinking] : denies binge drinking [Patient Concern] : no personal concern about alcohol use [Family Concern] : no family concern about alcohol use [Vision Problems] : She denies vision problems [Hearing Loss] : She denies hearing loss [de-identified] : 1/21, DUDLEY Nice [de-identified] : brother [de-identified] : hx flu shot 11/21 hx Tdap 2/17, hx hep B series, hx HPV series [FreeTextEntry1] : \par 30 yo F pmhx neurofibromatosis, allergic rhinitis, preDM, GERD, scoliosis for CPE\par \par Last seen in office 11/29/21 for f/u.\par \par Feeling well, no complaints.\par Fasting for labs.\par \par Reports is socially distancing and using precautions for covid prevention.\par Denies sick or covid positive contacts.\par Denies fever, chills, cough or sob.\par -hx J&J vaccine 4/4/21; hx pfizer booster 12/21\par \par hx neurofibromatosis- dx'd as infant, denies currently bothersome lesions\par -followed by neuro, seen 1/21 when advised MRI orbits (done 3/21)  and to f/u yearly.  Plans to make appt soon.\par -followed by plastics, last seen 2019 for excision of lesions\par -followed by optho, seen 3/21- denies vision issues.  States needs referral to new provider.\par -derm eval pending\par -denies HA, dizziness, paresthesias, weakness or joint swelling\par \par Denies hx mammo- states had screening breast MRI 2/21 for hx NF- found benign and advised repeat in 1 yr\par -denies breast pain, new lesions or nipple d/c\par -unknown FH of breast cancer (adopted)\par \par preDM- Wt stable in past year.\par -tries to eating healthy, cut out sweets/carbs \par -exercise: treadmill running 1 mile or cardio machines 4-5x/wk x 1 hr- done w/o sx's or limitations\par \par Reports nl appetite and BMs\par -denies GI complaints.  \par \par \par Denies  complaints.\par -not sexually active, no hx partners in past year, always uses condoms \par -denies hx STD dx\par \par \par Denies depression or anxiety.\par

## 2022-01-13 NOTE — PAST MEDICAL HISTORY
[Menarche Age ____] : age at menarche was [unfilled] [Regular Cycle Intervals] : have been regular [Total Preg ___] : G: [unfilled] [Definite ___ (Date)] : the last menstrual period was [unfilled]

## 2022-01-17 LAB
25(OH)D3 SERPL-MCNC: 24.9 NG/ML
ALBUMIN SERPL ELPH-MCNC: 4.6 G/DL
ALP BLD-CCNC: 59 U/L
ALT SERPL-CCNC: 7 U/L
ANION GAP SERPL CALC-SCNC: 14 MMOL/L
AST SERPL-CCNC: 15 U/L
BASOPHILS # BLD AUTO: 0.04 K/UL
BASOPHILS NFR BLD AUTO: 0.8 %
BILIRUB SERPL-MCNC: 0.6 MG/DL
BUN SERPL-MCNC: 9 MG/DL
CALCIUM SERPL-MCNC: 9.5 MG/DL
CHLORIDE SERPL-SCNC: 104 MMOL/L
CHOLEST SERPL-MCNC: 159 MG/DL
CO2 SERPL-SCNC: 20 MMOL/L
CREAT SERPL-MCNC: 0.65 MG/DL
EOSINOPHIL # BLD AUTO: 0.02 K/UL
EOSINOPHIL NFR BLD AUTO: 0.4 %
ESTIMATED AVERAGE GLUCOSE: 114 MG/DL
GLUCOSE SERPL-MCNC: 83 MG/DL
HBA1C MFR BLD HPLC: 5.6 %
HCT VFR BLD CALC: 42.3 %
HDLC SERPL-MCNC: 58 MG/DL
HGB BLD-MCNC: 13.1 G/DL
IMM GRANULOCYTES NFR BLD AUTO: 0.2 %
LDLC SERPL CALC-MCNC: 90 MG/DL
LYMPHOCYTES # BLD AUTO: 1.61 K/UL
LYMPHOCYTES NFR BLD AUTO: 31.6 %
MAN DIFF?: NORMAL
MCHC RBC-ENTMCNC: 29.4 PG
MCHC RBC-ENTMCNC: 31 GM/DL
MCV RBC AUTO: 95.1 FL
MONOCYTES # BLD AUTO: 0.48 K/UL
MONOCYTES NFR BLD AUTO: 9.4 %
NEUTROPHILS # BLD AUTO: 2.94 K/UL
NEUTROPHILS NFR BLD AUTO: 57.6 %
NONHDLC SERPL-MCNC: 101 MG/DL
PLATELET # BLD AUTO: 309 K/UL
POTASSIUM SERPL-SCNC: 4.5 MMOL/L
PROT SERPL-MCNC: 7.4 G/DL
RBC # BLD: 4.45 M/UL
RBC # FLD: 13.8 %
SODIUM SERPL-SCNC: 139 MMOL/L
TRIGL SERPL-MCNC: 54 MG/DL
TSH SERPL-ACNC: 1.42 UIU/ML
WBC # FLD AUTO: 5.1 K/UL

## 2022-01-21 ENCOUNTER — TRANSCRIPTION ENCOUNTER (OUTPATIENT)
Age: 32
End: 2022-01-21

## 2022-02-01 ENCOUNTER — NON-APPOINTMENT (OUTPATIENT)
Age: 32
End: 2022-02-01

## 2022-02-02 ENCOUNTER — APPOINTMENT (OUTPATIENT)
Dept: NEUROLOGY | Facility: CLINIC | Age: 32
End: 2022-02-02
Payer: COMMERCIAL

## 2022-02-02 PROCEDURE — 99214 OFFICE O/P EST MOD 30 MIN: CPT | Mod: 95

## 2022-02-02 NOTE — DISCUSSION/SUMMARY
[FreeTextEntry1] : 31-year-old woman who is here for follow-up of NF1.  Given the stability of her subcutaneous neurofibroma findings and nonfocal exam patient will hold off on any further imaging.  Patient will follow up with me in 1 year.\par \par physician location: office\par patient location: home\par \par I spent 30 minutes of total time, during which more than 50% of the time was spent on counseling. I explained the diagnosis, other possible diagnoses, workup, and management, as well as answered any questions.\par \par This is a telemedicine visit that was performed using real time 2-way audiovisual technology. Verbal consent to participate in video visit was obtained and patient was aware of their right to refuse to participate in services delivered via telemedicine and the alternative and potential limitations of participating in a telemedicine visit vs a face-to-face visit; I have also informed the patient of my current location in the [facility] and the names of all persons participating in the telemedicine service and their role in the encounter. The patient agrees to have this service via Telehealth.\par \par  This visit occurred during the Coronavirus (COVID-19) Public Health Emergency. I discussed with the patient the nature of our telemedicine visits, that: \par • I would evaluate the patient and recommend diagnostics and treatments based on my assessment \par • Our sessions are not being recorded and that personal health information is protected \par • Our team would provide follow up care in person if/when the patient needs it.\par

## 2022-02-02 NOTE — HISTORY OF PRESENT ILLNESS
[FreeTextEntry1] : verbal consent given on 02/02/2022 and 12:13  by HUSEYIN PANTOJA at [id number]\[place] [address]\par \par \par 31 W who is here for initial consultation of NF1. She does not know her family history as she's adopted. She had no history of seizures, optic glioma. There's neurofibromas all over her body. The last time she had and MRI of brain was when she was in high school and it was normal. 1992 mri of brain was unremarkable. She currently has no symptoms. She has upcoming ophthalmology and dermatology appts. \par \par interval history: she is seeing plastics for removal of neurofibroma. She has had no seizures or vision changes. Her last mRI in 2017 showed no acute changes. \par \par Interval history: Patient has had no seizures or vision changes.  Patient's last MRI in 2017 showed no acute changes.\par \par Interval history: Patient has no seizures or vision changes.  Patient's last MRI in 2021 shows stable 0.6 cm left temporal subcutaneous lesion that is likely a neurofibroma.  No orbital or intracranial lesions identified.  Patient has no medication changes or any new diagnoses since her last visit about a year ago.  Patient is doing well\par \par CONSENT FOR VIDEO MEDICAL VISIT1. I understand that my physician wishes me to engage in a telemedicine consultation.2. My physician has explained to me how the video conferencing technology will be used to affect such a consultation will not be the same as a direct patient/health care provider visit due to the fact that I will not be in the same room as my physician 3. I understand there are potential risks to this technology, including interruptions, unauthorized access and technical difficulties. I understand that my health care provider or I can discontinue the telemedicine consult/visit if it is felt that the videoconferencing connections are not adequate for the situation.4. I understand that my healthcare information may be shared with other individuals for scheduling and billing purposes. Others may also be present during the consultation other than my physician and consulting health care provider in order to operate the video equipment. The above mentioned people will all maintain confidentiality of the information obtained. I further understand that I will be informed of their presence in the consultation and thus will have the right to request the following: (1) omit specific details of my medical history/physical examination that are personally sensitive to me; (2) ask non-medical personnel to leave the telemedicine examination room: and or (3) terminate the consultation at any time.5. I have had the alternatives to a telemedicine consultation explained to me, and in choosing to participate in a telemedicine consultation. I understand that some parts of the exam involving physical tests may be conducted by individuals at my location at the direction of the consulting health care provider.6. In an emergent consultation, I understand that the responsibility of the telemedicine consulting specialist is to advise my local practitioner and that the specialist’s responsibility will conclude upon the termination of the video conference connection.7. I understand that billing will occur from both my physician and as a facility fee from the site from which I am presented.8. I have had a direct conversation with my physician, during which I had the opportunity to ask questions in regard to this procedure. My questions have been answered and the risks, benefits and any practical alternatives have been discussed with me in a language in which I understand\par

## 2022-02-02 NOTE — PHYSICAL EXAM
[Person] : oriented to person [Place] : oriented to place [Time] : oriented to time [Short Term Intact] : short term memory intact [Span Intact] : the attention span was normal [Naming Objects] : no difficulty naming common objects [Fluency] : fluency intact [Current Events] : adequate knowledge of current events [Cranial Nerves Oculomotor (III)] : extraocular motion intact [Cranial Nerves Facial (VII)] : face symmetrical [Cranial Nerves Vestibulocochlear (VIII)] : hearing was intact bilaterally [Cranial Nerves Accessory (XI - Cranial And Spinal)] : head turning and shoulder shrug symmetric [Cranial Nerves Hypoglossal (XII)] : there was no tongue deviation with protrusion [Paresis Pronator Drift Right-Sided] : no pronator drift on the right [Paresis Pronator Drift Left-Sided] : no pronator drift on the left [Abnormal Walk] : normal gait

## 2022-02-16 ENCOUNTER — OUTPATIENT (OUTPATIENT)
Dept: OUTPATIENT SERVICES | Facility: HOSPITAL | Age: 32
LOS: 1 days | End: 2022-02-16
Payer: COMMERCIAL

## 2022-02-16 ENCOUNTER — APPOINTMENT (OUTPATIENT)
Dept: MRI IMAGING | Facility: IMAGING CENTER | Age: 32
End: 2022-02-16
Payer: COMMERCIAL

## 2022-02-16 DIAGNOSIS — Q85.00 NEUROFIBROMATOSIS, UNSPECIFIED: ICD-10-CM

## 2022-02-16 PROCEDURE — 77049 MRI BREAST C-+ W/CAD BI: CPT | Mod: 26

## 2022-02-16 PROCEDURE — A9585: CPT

## 2022-02-16 PROCEDURE — C8937: CPT

## 2022-02-16 PROCEDURE — C8908: CPT

## 2022-02-17 ENCOUNTER — NON-APPOINTMENT (OUTPATIENT)
Age: 32
End: 2022-02-17

## 2022-03-07 ENCOUNTER — APPOINTMENT (OUTPATIENT)
Dept: DERMATOLOGY | Facility: CLINIC | Age: 32
End: 2022-03-07

## 2022-03-28 ENCOUNTER — APPOINTMENT (OUTPATIENT)
Dept: OPHTHALMOLOGY | Facility: CLINIC | Age: 32
End: 2022-03-28
Payer: COMMERCIAL

## 2022-03-28 ENCOUNTER — NON-APPOINTMENT (OUTPATIENT)
Age: 32
End: 2022-03-28

## 2022-03-28 PROCEDURE — 92133 CPTRZD OPH DX IMG PST SGM ON: CPT

## 2022-03-28 PROCEDURE — 92014 COMPRE OPH EXAM EST PT 1/>: CPT

## 2022-04-05 ENCOUNTER — LABORATORY RESULT (OUTPATIENT)
Age: 32
End: 2022-04-05

## 2022-04-05 ENCOUNTER — APPOINTMENT (OUTPATIENT)
Dept: PLASTIC SURGERY | Facility: CLINIC | Age: 32
End: 2022-04-05
Payer: COMMERCIAL

## 2022-04-05 PROCEDURE — 99213 OFFICE O/P EST LOW 20 MIN: CPT

## 2022-04-05 PROCEDURE — 99024 POSTOP FOLLOW-UP VISIT: CPT

## 2022-04-05 NOTE — HISTORY OF PRESENT ILLNESS
[FreeTextEntry1] : h/o Neurofibromatosis \par S/O excision of multiple neurofibromatomas with good healing and results\par \par Pt presents with additional masses of neck and mass of right finger\par no s/sx infection. no drainage\par finger mas sis bothersome and tender at times

## 2022-04-06 ENCOUNTER — APPOINTMENT (OUTPATIENT)
Dept: DERMATOLOGY | Facility: CLINIC | Age: 32
End: 2022-04-06
Payer: COMMERCIAL

## 2022-04-06 VITALS — WEIGHT: 148 LBS | BODY MASS INDEX: 23.89 KG/M2

## 2022-04-06 DIAGNOSIS — Z12.83 ENCOUNTER FOR SCREENING FOR MALIGNANT NEOPLASM OF SKIN: ICD-10-CM

## 2022-04-06 DIAGNOSIS — D22.9 MELANOCYTIC NEVI, UNSPECIFIED: ICD-10-CM

## 2022-04-06 DIAGNOSIS — L81.4 OTHER MELANIN HYPERPIGMENTATION: ICD-10-CM

## 2022-04-06 DIAGNOSIS — D48.5 NEOPLASM OF UNCERTAIN BEHAVIOR OF SKIN: ICD-10-CM

## 2022-04-06 PROCEDURE — 12031 INTMD RPR S/A/T/EXT 2.5 CM/<: CPT

## 2022-04-06 PROCEDURE — 99213 OFFICE O/P EST LOW 20 MIN: CPT | Mod: 25

## 2022-04-06 PROCEDURE — 11401 EXC TR-EXT B9+MARG 0.6-1 CM: CPT

## 2022-04-06 NOTE — HISTORY OF PRESENT ILLNESS
[FreeTextEntry1] : skin check [de-identified] : 31 year old female here for skin check. hx of neurobiromas excised by plastics.\par new growth, enlarging in right axillae.

## 2022-04-06 NOTE — PROCEDURE
[___ cm] : [unfilled] cm [___ ml of 1% lidocaine w/ 1:100,000 epinephrine] : [unfilled] ml of 1% lidocaine with 1:100,000 epinephrine [Pressure] : pressure [5-0] : 5-0 Monocryl [4-0] : 4-0 Nylon [____ cm] : [unfilled] cm [____ days] : [unfilled] days [FreeTextEntry1] : [place] Gil [FreeTextEntry7] : right axillae  [TWNoteComboBox1] : Epidermal Inclusion Cyst [TWNoteComboBox4] : Epidermal Inclusion Cyst [TWNoteComboBox3] : Subcutaneous fat

## 2022-04-06 NOTE — PHYSICAL EXAM
[FreeTextEntry3] : AAOx3, pleasant, NAD, no visual lymphadenopathy\par hair, scalp, face, nose, eyelids, ears, lips, oropharynx, neck, chest, abdomen, back, right arm, left arm, nails, and hands examined with all normal findings,\par pertinent findings include:\par \par multiple benign nevi and lentigines\par cystic nodule in right axillae

## 2022-04-15 ENCOUNTER — NON-APPOINTMENT (OUTPATIENT)
Age: 32
End: 2022-04-15

## 2022-04-19 ENCOUNTER — NON-APPOINTMENT (OUTPATIENT)
Age: 32
End: 2022-04-19

## 2022-04-20 ENCOUNTER — APPOINTMENT (OUTPATIENT)
Dept: DERMATOLOGY | Facility: CLINIC | Age: 32
End: 2022-04-20
Payer: COMMERCIAL

## 2022-04-20 PROCEDURE — 99213 OFFICE O/P EST LOW 20 MIN: CPT

## 2022-05-03 ENCOUNTER — APPOINTMENT (OUTPATIENT)
Dept: PLASTIC SURGERY | Facility: CLINIC | Age: 32
End: 2022-05-03
Payer: COMMERCIAL

## 2022-05-03 ENCOUNTER — LABORATORY RESULT (OUTPATIENT)
Age: 32
End: 2022-05-03

## 2022-05-03 PROCEDURE — 13132 CMPLX RPR F/C/C/M/N/AX/G/H/F: CPT

## 2022-05-03 PROCEDURE — 11442 EXC FACE-MM B9+MARG 1.1-2 CM: CPT | Mod: 59

## 2022-05-03 NOTE — PROCEDURE
[FreeTextEntry6] : Preopdx:neck masses. x3\par Procedure: excisional biopsy   1.5cm mass of neck, 1.1cm mass of neck, 1.2cm of neck, and complex closure 3.8cm of neck total\par Anesthesia: local 1% w/epi\par Specimens: to path on formalin\par No complications\par \par Summary:\par IC obtained.  Lesion demarcated with marking pen.  1%lido with epinephrine injected.  15 blade used to incise full thickness.    1.5cm  lesion excised as an ellipse full thickness in subcutaneous plane, as well as 1.1cm mass and 1.2cm masses.   Hemostasis obtained with cautery.  Skin edges widely undermined and closed for a complex closure of  3.8cm total.  bacitracin and steristrips placed.  \par \par

## 2022-05-13 ENCOUNTER — APPOINTMENT (OUTPATIENT)
Dept: PLASTIC SURGERY | Facility: CLINIC | Age: 32
End: 2022-05-13
Payer: COMMERCIAL

## 2022-05-13 PROCEDURE — 99024 POSTOP FOLLOW-UP VISIT: CPT

## 2022-05-13 NOTE — HISTORY OF PRESENT ILLNESS
[FreeTextEntry1] : h/o Neurofibromatosis \par DOP 05/03/22 excision and biopsy mass of neck, likely neurofibromas  \par  No excessive bleeding. No fevers. No odor. No purulent discharge. No excessive pain.\par \par Path: pending\par  \par

## 2022-05-31 ENCOUNTER — APPOINTMENT (OUTPATIENT)
Dept: PLASTIC SURGERY | Facility: CLINIC | Age: 32
End: 2022-05-31
Payer: COMMERCIAL

## 2022-05-31 ENCOUNTER — LABORATORY RESULT (OUTPATIENT)
Age: 32
End: 2022-05-31

## 2022-05-31 DIAGNOSIS — R22.30 LOCALIZED SWELLING, MASS AND LUMP, UNSPECIFIED UPPER LIMB: ICD-10-CM

## 2022-05-31 PROCEDURE — 13131 CMPLX RPR F/C/C/M/N/AX/G/H/F: CPT | Mod: 58

## 2022-05-31 PROCEDURE — 26115 EXC HAND LES SC < 1.5 CM: CPT

## 2022-05-31 NOTE — PROCEDURE
[FreeTextEntry1] : neurrofibroma, left ring [FreeTextEntry2] : excision of 1.1cm left ring finger mass, complex closure 1.1cm [FreeTextEntry3] : lido w/ epi [FreeTextEntry4] : min [FreeTextEntry5] : none [FreeTextEntry6] : IC obtained. lido w/ epi injected. 15 blade used to incise skin.  mass dissected and removed in subq plane.  1.1cm mass excised and sent to path.  hemostasis obtained. closed in layers with 4-0 monocryl after wide underming 1.1cm complex closure.  steris placed\par

## 2022-06-02 ENCOUNTER — APPOINTMENT (OUTPATIENT)
Dept: OBGYN | Facility: CLINIC | Age: 32
End: 2022-06-02
Payer: COMMERCIAL

## 2022-06-02 VITALS
SYSTOLIC BLOOD PRESSURE: 127 MMHG | HEART RATE: 85 BPM | HEIGHT: 66 IN | BODY MASS INDEX: 23.95 KG/M2 | WEIGHT: 149 LBS | DIASTOLIC BLOOD PRESSURE: 73 MMHG

## 2022-06-02 PROCEDURE — 99395 PREV VISIT EST AGE 18-39: CPT

## 2022-06-03 ENCOUNTER — APPOINTMENT (OUTPATIENT)
Dept: INTERNAL MEDICINE | Facility: CLINIC | Age: 32
End: 2022-06-03
Payer: COMMERCIAL

## 2022-06-03 VITALS
SYSTOLIC BLOOD PRESSURE: 110 MMHG | HEART RATE: 79 BPM | BODY MASS INDEX: 24.11 KG/M2 | DIASTOLIC BLOOD PRESSURE: 70 MMHG | OXYGEN SATURATION: 99 % | HEIGHT: 66 IN | TEMPERATURE: 98.4 F | RESPIRATION RATE: 16 BRPM | WEIGHT: 150 LBS

## 2022-06-03 PROCEDURE — 99213 OFFICE O/P EST LOW 20 MIN: CPT

## 2022-06-03 NOTE — ASSESSMENT
[FreeTextEntry1] : \par 30 yo F pmhx neurofibromatosis, allergic rhinitis, preDM, GERD, scoliosis for f/u\par \par \par hx preDM- 1/22 A1c 5.6 (was 5.2 prior)\par -cont ADA diet and exercise advised\par \par neurofibromatosis- since infancy, s/p multiple neurofibroma excisions, last 11/19.  Is s/p left ring finger lesion removed earlier this week by plastics, told +NF.  States is healing well.\par -hx negative MRI brain 9/17\par -3/21 MRI of orbits: Normal appearance of the orbits, including the optic nerves. No orbital or intracranial lesion identified.  0.6 cm left temporal subcutaneous lesion again noted, likely representing neurofibroma\par -followed by neuro, seen 3/22 with hx MRI orbits (done 3/21) and noted stable NF and planned to f/u yearly. \par -followed by plastics, last seen 5/22 s/p neck lesion excision with sutures removed, path: NF\par -followed by optho, seen 3/22 and planned to f/u yearly\par -followed by derm, last seen 4/22 with FBSE done. Also had right axially lesion bx - path: epidermal cyst\par \par hx scoliosis- asx\par -followed by ortho, seen 2/21 noted asx and advised no intervention, to f/u prn\par \par hx throat sx's- resolved, ? 2/2 food allergy (mustard/tomato) vs GERD- resolved\par -eating shrimp since incident w/o any SEs reported\par -hx eval by A/I 2/17 with negative food allergy testing, had lab testing and f/u 8/18 with no significant food allergies noted.  +testing to trees/weeds and grass\par -seen by ENT 3/17 with fiberoptic exam showing reflux, allergic rhinitis- is s/p 30d trial of omeprazole with sx resolution and off PPI x few months.  F/U pending, encouraged if allergy testing negative.\par -hx OTC PPI prn, now diet controlled (avoid citric foods)\par -advised to cont. avoidance of reflux causing foods and meals close to bedtime\par -declines epi pen\par -advised ER visit if any onset of facial/tongue swelling, rash or sob with eating food.\par \par vit d insuff-\par -on OTC supp\par \par \par MISC:  Continued social distancing and measure for covid19 prevention encouraged.  \par -hx J&J vaccine 4/4/21; hx pfizer booster 12/21\par \par \par HCM\par -hx CPE 1/22, yearly advised\par -hx flu shot 11/21\par -hx Tdap 2/17\par -hx hep B series, immune per 2/17 labs\par -hx HPV series\par -hx negative PAP/HPV 5/21 by GYN.  Had f/u 6/22 with yearly breast MRI and f/u planned.\par -hx screening breast MRI 2/22: benign, rec: repeat MRI in 1 yr \par -followed by derm, hx FBSE 4/22- yearly screening advised.  Regular use of sun block for skin cancer prevention counseled.\par \par Pt's cell: [phone]\par \par

## 2022-06-03 NOTE — PHYSICAL EXAM
[General Appearance - Alert] : alert [General Appearance - In No Acute Distress] : in no acute distress [General Appearance - Well-Appearing] : healthy appearing [Sclera] : the sclera and conjunctiva were normal [PERRL With Normal Accommodation] : pupils were equal in size, round, and reactive to light [Extraocular Movements] : extraocular movements were intact [Outer Ear] : the ears and nose were normal in appearance [Nasal Cavity] : the nasal mucosa and septum were normal [Oropharynx] : the oropharynx was normal [Neck Appearance] : the appearance of the neck was normal [] : the neck was supple [Thyroid Diffuse Enlargement] : the thyroid was not enlarged [Auscultation Breath Sounds / Voice Sounds] : lungs were clear to auscultation bilaterally [Respiration, Rhythm And Depth] : normal respiratory rhythm and effort [Heart Rate And Rhythm] : heart rate was normal and rhythm regular [Heart Sounds] : normal S1 and S2 [Murmurs] : no murmurs [Full Pulse] : the pedal pulses are present [Edema] : there was no peripheral edema [Bowel Sounds] : normal bowel sounds [Abdomen Soft] : soft [Abdomen Tenderness] : non-tender [Cervical Lymph Nodes Enlarged Posterior Bilaterally] : posterior cervical [Cervical Lymph Nodes Enlarged Anterior Bilaterally] : anterior cervical [Supraclavicular Lymph Nodes Enlarged Bilaterally] : supraclavicular [No CVA Tenderness] : no ~M costovertebral angle tenderness [No Spinal Tenderness] : no spinal tenderness [No Focal Deficits] : no focal deficits [Oriented To Time, Place, And Person] : oriented to person, place, and time [Affect] : the affect was normal [Mood] : the mood was normal [FreeTextEntry1] : scattered freckles and skin lesions on face, trunk, abdomen, hands, legs; well healed scar at left neck and right axilla, +small bandage at left ring finger- c/d/i

## 2022-06-03 NOTE — HISTORY OF PRESENT ILLNESS
[FreeTextEntry1] : \par 32 yo F pmhx neurofibromatosis, allergic rhinitis, preDM, GERD, scoliosis for f/u\par \par Last seen in office 1/13/22 for CPE with labs done.\par \par Feeling well.\par \par Reports is socially distancing and using precautions for covid prevention.\par Denies sick or covid positive contacts.\par Denies fever, chills, cough or sob.\par -hx J&J vaccine 4/4/21; hx pfizer booster 12/21\par \par hx neurofibromatosis- dx'd as infant, denies currently bothersome lesions.  Notes had left ring finger lesion removed earlier this week by plastics, told +NF.  States is healing well.\par -followed by neuro, seen 3/22 with hx MRI orbits (done 3/21) and noted stable NF and planned to f/u yearly. \par -followed by plastics, last seen 5/22 s/p neck lesion excision with sutures removed, path: NF\par -followed by optho, seen 3/22 and planned to f/u yearly\par -followed by derm, last seen 4/22 with FBSE done. Also had right axially lesion bx - path: epidermal cyst\par -denies HA, dizziness, paresthesias, weakness or joint swelling\par \par Denies hx mammo- states had screening breast MRI 2/22 for hx NF- found benign and advised repeat in 1 yr\par -denies breast pain, new lesions or nipple d/c\par -unknown FH of breast cancer (adopted)\par \par preDM- intentionally losing wt\par -tries to eating healthy, cut out sweets/carbs, avoids late meals\par -exercise: treadmill running 2 miles or cardio machines 3x/wk x 1 hr- done w/o sx's or limitations\par \par Reports nl appetite and BMs\par -denies GI complaints.  \par \par \par Denies  complaints.\par

## 2022-06-16 NOTE — PHYSICAL EXAM
[Appropriately responsive] : appropriately responsive [Alert] : alert [No Acute Distress] : no acute distress [No Lymphadenopathy] : no lymphadenopathy [Soft] : soft [Non-tender] : non-tender [Non-distended] : non-distended [No HSM] : No HSM [No Lesions] : no lesions [No Mass] : no mass [Oriented x3] : oriented x3 [Examination Of The Breasts] : a normal appearance [No Discharge] : no discharge [No Masses] : no breast masses were palpable [Labia Majora] : normal [Labia Minora] : normal [Normal] : normal [Uterine Adnexae] : normal [Chaperone Present] : A chaperone was present in the examining room during all aspects of the physical examination

## 2022-06-16 NOTE — HISTORY OF PRESENT ILLNESS
[Patient reported PAP Smear was normal] : Patient reported PAP Smear was normal [N] : Patient is not sexually active [FreeTextEntry1] : 31 year old P0 /27/2022 presents for annual gyn exam and offers no complaints. She has a hx of NF1. Pt recently had her first MRI for breast screening given her NF1. \par \par Of note, pt is adopted and does not know too much about her family hx. \par  [PapSmeardate] : 04/21 [LMPDate] : 04/27/21

## 2022-06-16 NOTE — COUNSELING
[Nutrition/ Exercise/ Weight Management] : nutrition, exercise, weight management [Vitamins/Supplements] : vitamins/supplements [Breast Self Exam] : breast self exam [FreeTextEntry2] : Menstrual Calendar

## 2022-09-08 ENCOUNTER — NON-APPOINTMENT (OUTPATIENT)
Age: 32
End: 2022-09-08

## 2022-09-08 ENCOUNTER — APPOINTMENT (OUTPATIENT)
Dept: PLASTIC SURGERY | Facility: CLINIC | Age: 32
End: 2022-09-08

## 2022-09-08 ENCOUNTER — LABORATORY RESULT (OUTPATIENT)
Age: 32
End: 2022-09-08

## 2022-09-08 PROCEDURE — 13131 CMPLX RPR F/C/C/M/N/AX/G/H/F: CPT | Mod: 58

## 2022-09-08 PROCEDURE — 21011 EXC FACE LES SC <2 CM: CPT

## 2022-09-08 NOTE — PROCEDURE
[FreeTextEntry6] : Preopdx: left cheek mass\par Procedure: excisional biopsy  1.1 cm, and complex closure 1.1 cm cheek\par Anesthesia: local 1% w/epi\par Specimens: to path on formalin\par No complications\par \par Summary:\par IC obtained.  Lesion demarcated with marking pen.  1%lido with epinephrine injected.  15 blade used to incise full thickness.    1.1Cm  lesion excised in subcutaneous plane.   Hemostasis obtained with cautery.  Skin edges widely undermined and closed for a complex closure of  1.1 cm.  bacitracin and steristrips placed.  \par \par \par

## 2022-09-16 ENCOUNTER — APPOINTMENT (OUTPATIENT)
Dept: PLASTIC SURGERY | Facility: CLINIC | Age: 32
End: 2022-09-16

## 2022-09-16 PROCEDURE — 99024 POSTOP FOLLOW-UP VISIT: CPT

## 2022-09-16 NOTE — HISTORY OF PRESENT ILLNESS
[FreeTextEntry1] : DOP  09/08/22 excision and biopsy mass of left cheek \par Path: neurofibroma and neuro cyst \par  No excessive bleeding. No fevers. No odor. No purulent discharge. No excessive pain.\par

## 2022-10-17 NOTE — PRE-ANESTHESIA EVALUATION ADULT - MALLAMPATI CLASS
65
Class I (easy) - visualization of the soft palate, fauces, uvula, and both anterior and posterior pillars

## 2022-10-24 NOTE — H&P PST ADULT - VENOUS THROMBOEMBOLISM SCORE
Patient notified.  She said that after she talked to the pharmacist, she went in to see him.  He looked at her neck and said that she surely was having an allergic reaction and to stop.  She didn't know if you would recommend another medication.     1

## 2022-10-24 NOTE — ASSESSMENT
[FreeTextEntry1] : \par 32 yo F pmhx neurofibromatosis, allergic rhinitis, preDM, GERD, scoliosis for CPE\par \par \par preDM- 1/21 A1c 5.2 (was 5.5 prior)\par -cont ADA diet and exercise advised\par -check A1c\par \par neurofibromatosis- since infancy, s/p multiple neurofibroma excisions, last 11/19.  Asx.\par -followed by neuro, seen 1/21 with MRI orbits advised (done 3/21) and to f/u annually.  F/U pending - encouraged\par -hx negative MRI brain 9/17\par -3/21 MRI of orbits: Normal appearance of the orbits, including the optic nerves. No orbital or intracranial lesion identified.  0.6 cm left temporal subcutaneous lesion again noted, likely representing neurofibroma\par -followed by optho, seen 3/21 for glaucoma suspect, NF and dry eyes with yearly screening advised- referral given\par -followed by derm, seen 9/17 - f/u advised, referral given\par -followed by plastic surgery, seen prn\par \par hx scoliosis- asx\par -followed by ortho, seen 2/21 noted asx and advised no intervention, to f/u prn\par \par hx throat sx's- resolved, ? 2/2 food allergy (mustard/tomato) vs GERD- resolved\par -eating shrimp since incident w/o any SEs reported\par -hx eval by A/I 2/17 with negative food allergy testing, had lab testing and f/u 8/18 with no significant food allergies noted.  +testing to trees/weeds and grass\par -seen by ENT 3/17 with fiberoptic exam showing reflux, allergic rhinitis- is s/p 30d trial of omeprazole with sx resolution and off PPI x few months.  F/U pending, encouraged if allergy testing negative.\par -hx OTC PPI prn, now diet controlled (avoid citric foods)\par -advised to cont. avoidance of reflux causing foods and meals close to bedtime\par -declines epi pen\par -advised ER visit if any onset of facial/tongue swelling, rash or sob with eating food.\par \par vit d insuff-\par -on OTC supp\par -check level\par \par MISC:  Continued social distancing and measure for covid19 prevention encouraged.  \par -hx J&J vaccine 4/4/21; hx pfizer booster 12/21\par \par \par HCM\par -check screening labs; declines HIV/STD screening\par -STD and pregnancy prevention with regular use of condoms counseled\par -hx flu shot 11/21\par -hx Tdap 2/17\par -hx hep B series, immune per 2/17 labs\par -hx HPV series\par -hx negative PAP/HPV 5/21 by GYN \par -hx screening breast MRI 2/21: benign, rec: repeat MRI in 1 yr - for Rx given. Asx.\par -yearly derm screening advised.  Regular use of sun block for skin cancer prevention counseled.\par -yearly dental screening advised\par -encouraged to obtain carbon monoxide detector for home safety\par \par Pt's cell: [phone]\par \par Labs drawn in office today.\par \par  Additional Notes: Flu shot not received by PCP or pharmacy Quality 110: Preventive Care And Screening: Influenza Immunization: Influenza Immunization not Administered for Documented Reasons. Detail Level: Detailed

## 2023-01-10 ENCOUNTER — APPOINTMENT (OUTPATIENT)
Dept: INTERNAL MEDICINE | Facility: CLINIC | Age: 33
End: 2023-01-10
Payer: COMMERCIAL

## 2023-01-10 VITALS
SYSTOLIC BLOOD PRESSURE: 120 MMHG | OXYGEN SATURATION: 99 % | HEART RATE: 91 BPM | RESPIRATION RATE: 16 BRPM | WEIGHT: 144 LBS | DIASTOLIC BLOOD PRESSURE: 62 MMHG | TEMPERATURE: 98.3 F | HEIGHT: 66 IN | BODY MASS INDEX: 23.14 KG/M2

## 2023-01-10 PROCEDURE — 99214 OFFICE O/P EST MOD 30 MIN: CPT | Mod: 25

## 2023-01-10 PROCEDURE — 36415 COLL VENOUS BLD VENIPUNCTURE: CPT

## 2023-01-10 NOTE — HISTORY OF PRESENT ILLNESS
[FreeTextEntry1] : \par 33 yo F pmhx neurofibromatosis, allergic rhinitis, preDM, GERD, scoliosis for f/u\par \par Last seen in office 6/3/22 for f/u.\par \par Feeling well.\par Fasting for labs.\par \par Reports is socially distancing and using precautions for covid prevention.\par Denies sick or covid positive contacts.\par Denies fever, chills, cough or sob.\par -hx J&J vaccine 4/4/21; hx pfizer booster 12/21, hx moderna 10/22\par \par hx neurofibromatosis- dx'd as infant, denies currently bothersome lesions.  Notes had left deek lesion removed 9/22k by plastics, told +NF.  States is healed well.\par -followed by neuro, seen 3/22 with hx MRI orbits (done 3/21) and noted stable NF and planned to f/u yearly. \par -followed by plastics, last seen 9/22 s/p left cheek lesion excision with sutures removed, path: NF\par -followed by optho, seen 3/22 and planned to f/u yearly\par -followed by derm, last seen 4/22 with FBSE done. Also had right axially lesion bx - path: epidermal cyst\par -denies HA, dizziness, paresthesias, weakness or joint swelling\par \par Denies hx mammo- states had screening breast MRI 2/22 for hx NF- found benign and advised repeat in 1 yr\par -denies breast pain, new lesions or nipple d/c\par -unknown FH of breast cancer (adopted)\par \par preDM- intentionally losing wt\par -tries to eating healthy, cut out sweets/carbs, avoids late meals\par -exercise: no formal, stays active - gets minimal ~ 10k steps/d.  Occasional treadmill running 2 miles or cardio machines 3x/wk x 1 hr- done w/o sx's or limitations\par \par Reports nl appetite and BMs\par -denies GI complaints.  \par \par \par Denies  complaints.\par -not sexually active, hx 1 male partner in past year, always uses condoms\par -denies hx STD dx\par

## 2023-01-10 NOTE — PHYSICAL EXAM
[General Appearance - Alert] : alert [General Appearance - In No Acute Distress] : in no acute distress [General Appearance - Well-Appearing] : healthy appearing [Sclera] : the sclera and conjunctiva were normal [PERRL With Normal Accommodation] : pupils were equal in size, round, and reactive to light [Extraocular Movements] : extraocular movements were intact [Outer Ear] : the ears and nose were normal in appearance [Nasal Cavity] : the nasal mucosa and septum were normal [Oropharynx] : the oropharynx was normal [Neck Appearance] : the appearance of the neck was normal [] : the neck was supple [Thyroid Diffuse Enlargement] : the thyroid was not enlarged [Respiration, Rhythm And Depth] : normal respiratory rhythm and effort [Auscultation Breath Sounds / Voice Sounds] : lungs were clear to auscultation bilaterally [Heart Rate And Rhythm] : heart rate was normal and rhythm regular [Heart Sounds] : normal S1 and S2 [Murmurs] : no murmurs [Full Pulse] : the pedal pulses are present [Edema] : there was no peripheral edema [Bowel Sounds] : normal bowel sounds [Abdomen Soft] : soft [Abdomen Tenderness] : non-tender [Cervical Lymph Nodes Enlarged Posterior Bilaterally] : posterior cervical [Cervical Lymph Nodes Enlarged Anterior Bilaterally] : anterior cervical [Supraclavicular Lymph Nodes Enlarged Bilaterally] : supraclavicular [No CVA Tenderness] : no ~M costovertebral angle tenderness [No Spinal Tenderness] : no spinal tenderness [No Focal Deficits] : no focal deficits [Oriented To Time, Place, And Person] : oriented to person, place, and time [Affect] : the affect was normal [Mood] : the mood was normal [FreeTextEntry1] : scattered freckles and skin lesions on face, trunk, abdomen, hands, legs

## 2023-01-10 NOTE — HEALTH RISK ASSESSMENT
[0] : 2) Feeling down, depressed, or hopeless: Not at all (0) [PHQ-2 Negative - No further assessment needed] : PHQ-2 Negative - No further assessment needed [[id number]] : 0

## 2023-01-10 NOTE — ASSESSMENT
[FreeTextEntry1] : \par 31 yo F pmhx neurofibromatosis, allergic rhinitis, preDM, GERD, scoliosis for f/u\par \par \par hx preDM- 1/22 A1c 5.6 (was 5.2 prior)\par -cont ADA diet and exercise advised\par \par neurofibromatosis- since infancy, s/p multiple neurofibroma excisions, last 11/19.  Is s/p left ring finger lesion removed earlier this week by plastics, told +NF.  States is healing well.\par -hx negative MRI brain 9/17\par -3/21 MRI of orbits: Normal appearance of the orbits, including the optic nerves. No orbital or intracranial lesion identified.  0.6 cm left temporal subcutaneous lesion again noted, likely representing neurofibroma\par -followed by neuro, seen 3/22 with hx MRI orbits (done 3/21) and noted stable NF and planned to f/u yearly. \par -followed by plastics, last seen 9/22 s/p neck lesion excision with sutures removed, path: NF\par -followed by optho, seen 3/22 and planned to f/u yearly\par -followed by derm, last seen 4/22 with FBSE done. Also had right axially lesion bx - path: epidermal cyst\par \par hx scoliosis- asx\par -followed by ortho, seen 2/21 noted asx and advised no intervention, to f/u prn\par \par hx throat sx's- resolved, ? 2/2 food allergy (mustard/tomato) vs GERD- resolved\par -eating shrimp since incident w/o any SEs reported\par -hx eval by A/I 2/17 with negative food allergy testing, had lab testing and f/u 8/18 with no significant food allergies noted.  +testing to trees/weeds and grass\par -seen by ENT 3/17 with fiberoptic exam showing reflux, allergic rhinitis- is s/p 30d trial of omeprazole with sx resolution and off PPI x few months.  F/U pending, encouraged if allergy testing negative.\par -hx OTC PPI prn, now diet controlled (avoid citric foods)\par -advised to cont. avoidance of reflux causing foods and meals close to bedtime\par -declines epi pen\par -advised ER visit if any onset of facial/tongue swelling, rash or sob with eating food.\par \par vit d insuff-\par -on OTC supp\par \par \par MISC:  Continued social distancing and measure for covid19 prevention encouraged.  \par -hx J&J vaccine 4/4/21; hx pfizer booster 12/21, hx moderna 10/22\par \par \par HCM\par -hx CPE 1/22, yearly advised\par -check screening labs; agreeable to HIV/STD screening\par -STD and pregnancy prevention with regular use of condoms counseled\par -hx flu shot 12/22\par -hx Tdap 2/17\par -hx hep B series, immune per 2/17 labs\par -hx HPV series\par -hx negative PAP/HPV 5/21 by GYN.  Had f/u 6/22 with yearly breast MRI and f/u planned.\par -hx screening breast MRI 2/22: benign, rec: repeat MRI in 1 yr.  \par -followed by derm, hx FBSE 4/22- yearly screening advised.  Regular use of sun block for skin cancer prevention counseled.\par \par Pt's cell: [phone]\par \par Labs drawn in office today.\par \par

## 2023-01-11 LAB
25(OH)D3 SERPL-MCNC: 31 NG/ML
ALBUMIN SERPL ELPH-MCNC: 4.6 G/DL
ALP BLD-CCNC: 57 U/L
ALT SERPL-CCNC: 11 U/L
ANION GAP SERPL CALC-SCNC: 12 MMOL/L
AST SERPL-CCNC: 15 U/L
BASOPHILS # BLD AUTO: 0.06 K/UL
BASOPHILS NFR BLD AUTO: 1.1 %
BILIRUB SERPL-MCNC: 0.5 MG/DL
BUN SERPL-MCNC: 9 MG/DL
C TRACH RRNA SPEC QL NAA+PROBE: NOT DETECTED
CALCIUM SERPL-MCNC: 9.3 MG/DL
CHLORIDE SERPL-SCNC: 104 MMOL/L
CHOLEST SERPL-MCNC: 160 MG/DL
CO2 SERPL-SCNC: 22 MMOL/L
CREAT SERPL-MCNC: 0.59 MG/DL
EGFR: 122 ML/MIN/1.73M2
EOSINOPHIL # BLD AUTO: 0.03 K/UL
EOSINOPHIL NFR BLD AUTO: 0.6 %
ESTIMATED AVERAGE GLUCOSE: 117 MG/DL
GLUCOSE SERPL-MCNC: 92 MG/DL
HBA1C MFR BLD HPLC: 5.7 %
HBV CORE IGG+IGM SER QL: NONREACTIVE
HBV SURFACE AB SER QL: REACTIVE
HBV SURFACE AG SER QL: NONREACTIVE
HCT VFR BLD CALC: 40.2 %
HCV AB SER QL: NONREACTIVE
HCV S/CO RATIO: 0.11 S/CO
HDLC SERPL-MCNC: 73 MG/DL
HGB BLD-MCNC: 12.5 G/DL
HIV1+2 AB SPEC QL IA.RAPID: NONREACTIVE
IMM GRANULOCYTES NFR BLD AUTO: 0.4 %
LDLC SERPL CALC-MCNC: 78 MG/DL
LYMPHOCYTES # BLD AUTO: 1.72 K/UL
LYMPHOCYTES NFR BLD AUTO: 31.7 %
MAN DIFF?: NORMAL
MCHC RBC-ENTMCNC: 28.5 PG
MCHC RBC-ENTMCNC: 31.1 GM/DL
MCV RBC AUTO: 91.6 FL
MONOCYTES # BLD AUTO: 0.53 K/UL
MONOCYTES NFR BLD AUTO: 9.8 %
N GONORRHOEA RRNA SPEC QL NAA+PROBE: NOT DETECTED
NEUTROPHILS # BLD AUTO: 3.06 K/UL
NEUTROPHILS NFR BLD AUTO: 56.4 %
NONHDLC SERPL-MCNC: 87 MG/DL
PLATELET # BLD AUTO: 308 K/UL
POTASSIUM SERPL-SCNC: 3.7 MMOL/L
PROT SERPL-MCNC: 7.3 G/DL
RBC # BLD: 4.39 M/UL
RBC # FLD: 13.9 %
SODIUM SERPL-SCNC: 137 MMOL/L
SOURCE AMPLIFICATION: NORMAL
T PALLIDUM AB SER QL IA: NEGATIVE
TRIGL SERPL-MCNC: 46 MG/DL
TSH SERPL-ACNC: 1.98 UIU/ML
WBC # FLD AUTO: 5.42 K/UL

## 2023-01-12 ENCOUNTER — NON-APPOINTMENT (OUTPATIENT)
Age: 33
End: 2023-01-12

## 2023-03-13 NOTE — PAST MEDICAL HISTORY
[Menarche Age ____] : age at menarche was [unfilled] [Regular Cycle Intervals] : have been regular [Total Preg ___] : G: [unfilled]

## 2023-03-14 ENCOUNTER — APPOINTMENT (OUTPATIENT)
Dept: INTERNAL MEDICINE | Facility: CLINIC | Age: 33
End: 2023-03-14
Payer: COMMERCIAL

## 2023-03-14 VITALS
DIASTOLIC BLOOD PRESSURE: 72 MMHG | HEIGHT: 66 IN | SYSTOLIC BLOOD PRESSURE: 118 MMHG | HEART RATE: 86 BPM | BODY MASS INDEX: 23.14 KG/M2 | OXYGEN SATURATION: 99 % | TEMPERATURE: 98.3 F | RESPIRATION RATE: 16 BRPM | WEIGHT: 144 LBS

## 2023-03-14 DIAGNOSIS — E55.9 VITAMIN D DEFICIENCY, UNSPECIFIED: ICD-10-CM

## 2023-03-14 DIAGNOSIS — Z00.00 ENCOUNTER FOR GENERAL ADULT MEDICAL EXAMINATION W/OUT ABNORMAL FINDINGS: ICD-10-CM

## 2023-03-14 DIAGNOSIS — Z87.39 PERSONAL HISTORY OF OTHER DISEASES OF THE MUSCULOSKELETAL SYSTEM AND CONNECTIVE TISSUE: ICD-10-CM

## 2023-03-14 PROCEDURE — 96127 BRIEF EMOTIONAL/BEHAV ASSMT: CPT

## 2023-03-14 PROCEDURE — 99395 PREV VISIT EST AGE 18-39: CPT | Mod: 25

## 2023-03-14 NOTE — PHYSICAL EXAM
[General Appearance - Alert] : alert [General Appearance - In No Acute Distress] : in no acute distress [General Appearance - Well-Appearing] : healthy appearing [Sclera] : the sclera and conjunctiva were normal [PERRL With Normal Accommodation] : pupils were equal in size, round, and reactive to light [Extraocular Movements] : extraocular movements were intact [Outer Ear] : the ears and nose were normal in appearance [Nasal Cavity] : the nasal mucosa and septum were normal [Oropharynx] : the oropharynx was normal [Neck Appearance] : the appearance of the neck was normal [Thyroid Diffuse Enlargement] : the thyroid was not enlarged [Respiration, Rhythm And Depth] : normal respiratory rhythm and effort [Auscultation Breath Sounds / Voice Sounds] : lungs were clear to auscultation bilaterally [Heart Rate And Rhythm] : heart rate was normal and rhythm regular [Heart Sounds] : normal S1 and S2 [Murmurs] : no murmurs [Full Pulse] : the pedal pulses are present [Edema] : there was no peripheral edema [Bowel Sounds] : normal bowel sounds [Abdomen Soft] : soft [Abdomen Tenderness] : non-tender [Cervical Lymph Nodes Enlarged Posterior Bilaterally] : posterior cervical [Cervical Lymph Nodes Enlarged Anterior Bilaterally] : anterior cervical [Supraclavicular Lymph Nodes Enlarged Bilaterally] : supraclavicular [No CVA Tenderness] : no ~M costovertebral angle tenderness [No Spinal Tenderness] : no spinal tenderness [No Focal Deficits] : no focal deficits [Oriented To Time, Place, And Person] : oriented to person, place, and time [Affect] : the affect was normal [Mood] : the mood was normal [Both Tympanic Membranes Were Examined] : both tympanic membranes were normal [Thyroid Nodule] : there were no palpable thyroid nodules [] : no hepato-splenomegaly [Abdomen Mass (___ Cm)] : no abdominal mass palpated [FreeTextEntry1] : scattered freckles and skin lesions on face, trunk, abdomen, hands, legs w/o focal inflammation

## 2023-03-14 NOTE — HISTORY OF PRESENT ILLNESS
[Health Maintenance] : health maintenance [Mother] : mother [Unmarried] : unmarried [Never Smoked Cigarettes] : has never smoked cigarettes [Rare Use] : rare alcohol use [Never] : has never used illicit drugs [Good] : good [Reg. Dental Visits] : She has regular dental visits [Healthy Diet] : She consumes a diverse and healthy diet [Regular Exercise] : She exercises regularly [Premenopausal] : the patient is premenopausal [Working Part-Time] : working part-time [Occupation ___] : occupation: [unfilled] [Binge Drinking] : denies binge drinking [Patient Concern] : no personal concern about alcohol use [Family Concern] : no family concern about alcohol use [Vision Problems] : She denies vision problems [Hearing Loss] : She denies hearing loss [de-identified] : 1/22 [de-identified] : brother [de-identified] : hx flu shot 12/22 hx Tdap 2/17, hx hep B series, hx HPV series [FreeTextEntry1] : \par 33 yo F pmhx neurofibromatosis, allergic rhinitis, preDM, GERD, scoliosis for CPE\par \par Last seen in office 1/10/23 for f/u with labs done.\par \par Feeling well, no complaints.\par \par Reports is socially distancing and using precautions for covid prevention.\par Denies sick or covid positive contacts.\par Denies fever, chills, cough or sob.\par -hx J&J vaccine 4/4/21; hx pfizer booster 12/21, hx moderna 10/22\par \par hx neurofibromatosis- dx'd as infant, denies currently bothersome lesions.  Denies hx of seizure or vision trouble.\par -Notes had left cheek lesion removed 9/22 by plastics, told +NF.  States is healed well.  \par -followed by neuro, seen 2/22 with hx MRI orbits (done 3/21) and noted stable NF and planned to f/u yearly- in process of scheduling\par -followed by plastics, last seen 9/22 s/p left cheek lesion excision with sutures removed, path: NF\par -followed by optho, seen 3/22 and planned to f/u yearly\par -followed by derm, last seen 4/22 with FBSE done. Also had right axially lesion bx - path: epidermal cyst.  Has f/u appt 3/23.\par -denies HA, dizziness, paresthesias, weakness or joint swelling\par \par Denies hx mammo- hx screening breast MRI 2/22 for hx NF- found benign and advised repeat in 1 yr\par -denies breast pain, new lesions or nipple d/c\par -unknown FH of breast cancer (adopted)\par \par preDM- \par -tries to eating healthy, cut out sweets/carbs, avoids late meals -doing so more consistently since last visit\par -exercise: yoga 1x/wj, walking ~ 10k steps/d.  Occasional treadmill running 2 miles or cardio machines 3x/wk x 20 mins- done w/o sx's or limitations\par \par Reports nl appetite and BMs\par -denies GI complaints.  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  Considering looking for full time in few months but does not feel pressed to do so\par \par

## 2023-03-14 NOTE — ASSESSMENT
[FreeTextEntry1] : \par 33 yo F pmhx neurofibromatosis, allergic rhinitis, preDM, GERD, scoliosis for CPE\par \par \par preDM- 1/23 A1c 5.7 (was 5.6)\par -cont ADA diet and exercise advised\par -Patient defers check A1c to 3mo\par \par neurofibromatosis- since infancy, s/p multiple neurofibroma excisions, last 9/22.  Asx.\par -Is s/p left cheek lesion excision by plastics 9/22, told +NF. \par -hx negative MRI brain 9/17\par -3/21 MRI of orbits: Normal appearance of the orbits, including the optic nerves. No orbital or intracranial lesion identified.  0.6 cm left temporal subcutaneous lesion again noted, likely representing neurofibroma\par -followed by neuro, seen 3/22 with hx MRI orbits (done 3/21) and noted stable NF and planned to f/u yearly.  Follow-up pending, encouraged\par -followed by plastics, last seen 9/22 s/p neck lesion excision with sutures removed, path: NF\par -followed by optho, seen 3/22 and planned to f/u yearly.  Has follow-up appt 4/23.\par -followed by derm, last seen 4/22 with FBSE done. Also had right axially lesion bx - path: epidermal cyst. Has follow-up appt 3/23.\par \par hx scoliosis- asx\par -followed by ortho, seen 2/21 noted asx and advised no intervention, to f/u prn\par \par hx throat sx's- resolved, ? 2/2 food allergy (mustard/tomato) vs GERD- resolved\par -eating shrimp since incident w/o any SEs reported\par -hx eval by A/I 2/17 with negative food allergy testing, had lab testing and f/u 8/18 with no significant food allergies noted.  +testing to trees/weeds and grass\par -seen by ENT 3/17 with fiberoptic exam showing reflux, allergic rhinitis- is s/p 30d trial of omeprazole with sx resolution and off PPI x few months.  F/U pending, encouraged if allergy testing negative.\par -hx OTC PPI prn, now diet controlled (avoids citric foods)\par -advised to cont. avoidance of triggering foods and meals close to bedtime\par -declines epi pen\par -advised ER visit if any onset of facial/tongue swelling, rash or sob with eating food.\par \par vit d insuff-\par -on OTC supp\par -1/23 vit d level wnl\par \par \par MISC:  Continued social distancing and measure for covid19 prevention encouraged.  \par -hx J&J vaccine 4/4/21; hx pfizer booster 12/21, hx moderna 10/22\par \par \par HCM\par -1/23 cbc/cmp/TSH/lipids/vit d/STD screening unremarkable\par -STD and pregnancy prevention with regular use of condoms counseled\par -hx flu shot 12/22\par -hx Tdap 2/17\par -hx hep B series, immune per 1/23 labs\par -hx HPV series\par -hx negative PAP/HPV 5/21 by GYN.  Had f/u 6/22 with yearly breast MRI and f/u planned.\par -hx screening breast MRI 2/22: benign, rec: repeat MRI in 1 yr.  Rx for repeat given.\par -followed by derm, hx FBSE 4/22- yearly screening advised.  Regular use of sun block for skin cancer prevention counseled.\par -yearly dental screening advised\par \par Pt's cell: [phone]\par \par

## 2023-03-14 NOTE — HEALTH RISK ASSESSMENT
[0] : 2) Feeling down, depressed, or hopeless: Not at all (0) [PHQ-2 Negative - No further assessment needed] : PHQ-2 Negative - No further assessment needed [Patient reported PAP Smear was normal] : Patient reported PAP Smear was normal [Feels Safe at Home] : Feels safe at home [Smoke Detector] : smoke detector [Seat Belt] :  uses seat belt [Sunscreen] : uses sunscreen [Carbon Monoxide Detector] : carbon monoxide detector [VXL6Mfdrs] : 0 [Guns at Home] : no guns at home [MammogramComments] : 2/22 breast MRI: benign, rec: yearly screening [PapSmearDate] : 05/21 [HIVDate] : 01/23 [HIVComments] : negative [HepatitisCDate] : 01/23 [HepatitisCComments] : negative

## 2023-03-21 ENCOUNTER — APPOINTMENT (OUTPATIENT)
Dept: DERMATOLOGY | Facility: CLINIC | Age: 33
End: 2023-03-21
Payer: COMMERCIAL

## 2023-03-21 PROCEDURE — 99213 OFFICE O/P EST LOW 20 MIN: CPT

## 2023-03-22 ENCOUNTER — APPOINTMENT (OUTPATIENT)
Dept: MRI IMAGING | Facility: CLINIC | Age: 33
End: 2023-03-22

## 2023-03-26 NOTE — HISTORY OF PRESENT ILLNESS
[FreeTextEntry1] : neurofibromas  [de-identified] : HUSEYIN PANTOJA is a 32 year year old female w/ NF1 who presents for: \par 1. Neurofibromatosis type 1. Follows with pcp, nuerology, ophtho. No new neurofibromas, no lesions that are symptomatic, growing or changing. Patient previously had lesions excised by plastic surgery.\par 2. FBSE, no personal hx of skin cancer. Adopted. Does not know family hx. \par History:\par No history of neurologic symptoms.\par Adopted does not know family hx. \par

## 2023-03-26 NOTE — PHYSICAL EXAM
[FreeTextEntry3] : AAOx3, NAD, well-appearing / pleasant\par Total body skin exam performed within normal limits with the exception of: \par limited genital/groin evaluation\par \par multiple benign nevi and lentigines \par skin colored to tan fleshy papulonodules on trunk and extremities

## 2023-03-26 NOTE — ASSESSMENT
[External notes review: [ enter provider(s) name(s) ] :____] : As part of my evaluation, I have reviewed prior clinical note(s) from provider(s) outside of my group practice. The name(s) are: [unfilled] [FreeTextEntry1] : NF1\par - skin exam c/w multiple neurofibromas\par - no worrisome findings on exam \par - agree with annual dermatologic evaluation; ophthalmology and neurology evaluation

## 2023-04-03 ENCOUNTER — APPOINTMENT (OUTPATIENT)
Dept: OPHTHALMOLOGY | Facility: CLINIC | Age: 33
End: 2023-04-03

## 2023-06-08 ENCOUNTER — APPOINTMENT (OUTPATIENT)
Dept: INTERNAL MEDICINE | Facility: CLINIC | Age: 33
End: 2023-06-08

## 2023-06-27 ENCOUNTER — APPOINTMENT (OUTPATIENT)
Dept: NEUROLOGY | Facility: CLINIC | Age: 33
End: 2023-06-27

## 2024-01-23 ENCOUNTER — APPOINTMENT (OUTPATIENT)
Dept: INTERNAL MEDICINE | Facility: CLINIC | Age: 34
End: 2024-01-23
Payer: COMMERCIAL

## 2024-01-23 VITALS
OXYGEN SATURATION: 98 % | HEART RATE: 91 BPM | TEMPERATURE: 98.7 F | DIASTOLIC BLOOD PRESSURE: 78 MMHG | RESPIRATION RATE: 16 BRPM | SYSTOLIC BLOOD PRESSURE: 118 MMHG | HEIGHT: 66 IN | WEIGHT: 150 LBS | BODY MASS INDEX: 24.11 KG/M2

## 2024-01-23 DIAGNOSIS — Z23 ENCOUNTER FOR IMMUNIZATION: ICD-10-CM

## 2024-01-23 DIAGNOSIS — R73.03 PREDIABETES.: ICD-10-CM

## 2024-01-23 PROCEDURE — 90686 IIV4 VACC NO PRSV 0.5 ML IM: CPT

## 2024-01-23 PROCEDURE — 99214 OFFICE O/P EST MOD 30 MIN: CPT | Mod: 25

## 2024-01-23 PROCEDURE — 36415 COLL VENOUS BLD VENIPUNCTURE: CPT

## 2024-01-23 PROCEDURE — G0008: CPT

## 2024-01-23 NOTE — PHYSICAL EXAM
[General Appearance - Alert] : alert [General Appearance - In No Acute Distress] : in no acute distress [General Appearance - Well-Appearing] : healthy appearing [Sclera] : the sclera and conjunctiva were normal [PERRL With Normal Accommodation] : pupils were equal in size, round, and reactive to light [Extraocular Movements] : extraocular movements were intact [Outer Ear] : the ears and nose were normal in appearance [Nasal Cavity] : the nasal mucosa and septum were normal [Oropharynx] : the oropharynx was normal [Neck Appearance] : the appearance of the neck was normal [Thyroid Diffuse Enlargement] : the thyroid was not enlarged [Respiration, Rhythm And Depth] : normal respiratory rhythm and effort [Auscultation Breath Sounds / Voice Sounds] : lungs were clear to auscultation bilaterally [Heart Rate And Rhythm] : heart rate was normal and rhythm regular [Heart Sounds] : normal S1 and S2 [Murmurs] : no murmurs [Full Pulse] : the pedal pulses are present [Edema] : there was no peripheral edema [Bowel Sounds] : normal bowel sounds [Abdomen Soft] : soft [] : no hepato-splenomegaly [Abdomen Tenderness] : non-tender [Abdomen Mass (___ Cm)] : no abdominal mass palpated [Cervical Lymph Nodes Enlarged Posterior Bilaterally] : posterior cervical [Cervical Lymph Nodes Enlarged Anterior Bilaterally] : anterior cervical [Supraclavicular Lymph Nodes Enlarged Bilaterally] : supraclavicular [No CVA Tenderness] : no ~M costovertebral angle tenderness [No Spinal Tenderness] : no spinal tenderness [No Focal Deficits] : no focal deficits [Affect] : the affect was normal [Oriented To Time, Place, And Person] : oriented to person, place, and time [Mood] : the mood was normal [FreeTextEntry1] : scattered freckles and skin lesions on face, trunk, abdomen, hands, legs w/o focal inflammation

## 2024-01-23 NOTE — HISTORY OF PRESENT ILLNESS
[FreeTextEntry1] :   32 yo F pmhx neurofibromatosis, allergic rhinitis, preDM, GERD, scoliosis for f/u  Last seen in office 3/14/23 for CPE.  Feeling well, no complaints.  Reports is socially distancing and using precautions for covid prevention- on/off. Denies sick or covid positive contacts. Denies fever, chills, cough or sob. -hx J&J vaccine 4/4/21; hx pfizer booster 12/21, hx moderna 10/22  hx neurofibromatosis- dx'd as infant, denies currently bothersome lesions.  Denies hx of seizure or vision trouble. -Notes had left cheek lesion removed 9/22 by plastics, told +NF.  States is healed well.   -followed by neuro, seen 2/22 with hx MRI orbits (done 3/21) and noted stable NF and planned to f/u yearly- has appt 4/24 -followed by plastics, last seen 9/22 s/p left cheek lesion excision with sutures removed, path: NF -followed by optho, seen 3/22 and planned to f/u yearly.  Has appt 2/24. -followed by derm, last seen 3/23 with FBSE done.  Has f/u appt 3/24 -denies HA, dizziness, paresthesias, weakness or joint swelling  Denies hx mammo- hx screening breast MRI 2/22 for hx NF- found benign and advised repeat in 1 yr - pending 2/24 -denies breast pain, new lesions or nipple d/c -unknown FH of breast cancer (adopted)  preDM-  notes put on few lbs in recent months, attributes to not being as adherent with diet/exercise but doing sanchez -tries to eating healthy, cut out sweets/carbs, avoids late meals -doing so more consistently since last visit -exercise: yoga 1x/wk, walking ~ 10k steps/d.  Occasional treadmill running 2 miles or cardio machines 3x/wk x 20 mins- done w/o sx's or limitations  Reports nl appetite and BMs -denies GI complaints.    Denies  complaints.   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

## 2024-01-23 NOTE — ASSESSMENT
[FreeTextEntry1] : ____________________________________________________________ 32 yo F pmhx neurofibromatosis, allergic rhinitis, preDM, GERD, scoliosis for f/u   preDM- 1/23 A1c 5.7 (was 5.6), recent wt gain -ADA diet and exercise advised -interested to see nutritionist, referral given -check A1c   neurofibromatosis- since infancy, s/p multiple neurofibroma excisions, last 9/22. Asx. -Is s/p left cheek lesion excision by plastics 9/22, told +NF. -hx negative MRI brain 9/17 -3/21 MRI of orbits: Normal appearance of the orbits, including the optic nerves. No orbital or intracranial lesion identified. 0.6 cm left temporal subcutaneous lesion again noted, likely representing neurofibroma -followed by neuro, seen 2/22 with hx MRI orbits (done 3/21) and noted stable NF and planned to f/u yearly- has appt 4/24 -followed by plastics, last seen 9/22 s/p left cheek lesion excision with sutures removed, path: NF -followed by optho, seen 3/22 and planned to f/u yearly. Has appt 2/24. -followed by derm, last seen 3/23 with FBSE done. Has f/u appt 3/24  MISC: Continued social distancing and measure for covid19 prevention encouraged. -hx J&J vaccine 4/4/21; hx pfizer booster 12/21, hx moderna 10/22   HCM -hx CPE 3/23, yearly advised - for fasting screening labs at that time -1/23 cbc/cmp/TSH/lipids/vit d/STD screening unremarkable -flu shot today -hx Tdap 2/17 -hx hep B series, immune per 1/23 labs -hx HPV series -hx negative PAP/HPV 6/22 by GYN. Has f/u pending 4/24 -hx screening breast MRI 2/22: benign, rec: repeat MRI in 1 yr - repeat pending 2/24 -followed by derm, hx FBSE 3/23- yearly screening advised. Regular use of sun block for skin cancer prevention counseled.  Labs drawn in office today.  Pt's cell: [phone]  Patient advised that I will be leaving practice as of 3/1/24.  Encouraged to establish care with another colleague in office for continued medical care after my departure-agreeable.

## 2024-01-23 NOTE — HEALTH RISK ASSESSMENT
[Never] : Never [0] : 2) Feeling down, depressed, or hopeless: Not at all (0) [PHQ-2 Negative - No further assessment needed] : PHQ-2 Negative - No further assessment needed [[id number]] : 0

## 2024-01-24 LAB
ESTIMATED AVERAGE GLUCOSE: 111 MG/DL
HBA1C MFR BLD HPLC: 5.5 %

## 2024-02-09 ENCOUNTER — APPOINTMENT (OUTPATIENT)
Dept: MRI IMAGING | Facility: IMAGING CENTER | Age: 34
End: 2024-02-09
Payer: COMMERCIAL

## 2024-02-09 ENCOUNTER — OUTPATIENT (OUTPATIENT)
Dept: OUTPATIENT SERVICES | Facility: HOSPITAL | Age: 34
LOS: 1 days | End: 2024-02-09
Payer: COMMERCIAL

## 2024-02-09 DIAGNOSIS — Z00.8 ENCOUNTER FOR OTHER GENERAL EXAMINATION: ICD-10-CM

## 2024-02-09 PROCEDURE — A9585: CPT

## 2024-02-09 PROCEDURE — C8937: CPT

## 2024-02-09 PROCEDURE — 77049 MRI BREAST C-+ W/CAD BI: CPT | Mod: 26

## 2024-02-09 PROCEDURE — C8908: CPT

## 2024-02-14 NOTE — HISTORY OF PRESENT ILLNESS
[FreeTextEntry1] : No complaints. SIte healing well per pt. No excessive bleeding. No fevers. No odor. No purulent discharge. No excessive pain.\par Has not started using silicone gel or massaging yet\par 
[phone]

## 2024-02-29 ENCOUNTER — APPOINTMENT (OUTPATIENT)
Dept: OPHTHALMOLOGY | Facility: CLINIC | Age: 34
End: 2024-02-29
Payer: COMMERCIAL

## 2024-02-29 ENCOUNTER — NON-APPOINTMENT (OUTPATIENT)
Age: 34
End: 2024-02-29

## 2024-02-29 PROCEDURE — 92014 COMPRE OPH EXAM EST PT 1/>: CPT

## 2024-03-07 ENCOUNTER — APPOINTMENT (OUTPATIENT)
Dept: DERMATOLOGY | Facility: CLINIC | Age: 34
End: 2024-03-07
Payer: COMMERCIAL

## 2024-03-07 DIAGNOSIS — L81.3 NEUROFIBROMATOSIS, UNSPECIFIED: ICD-10-CM

## 2024-03-07 DIAGNOSIS — Q85.00 NEUROFIBROMATOSIS, UNSPECIFIED: ICD-10-CM

## 2024-03-07 PROCEDURE — 99214 OFFICE O/P EST MOD 30 MIN: CPT

## 2024-03-07 NOTE — ASSESSMENT
[External notes review: [ enter provider(s) name(s) ] :____] : As part of my evaluation, I have reviewed prior clinical note(s) from provider(s) outside of my group practice. The name(s) are: [unfilled] [FreeTextEntry1] : NF1 - skin exam c/w multiple neurofibromas and cafe au lait macules - no worrisome findings on exam  - agree with annual dermatologic evaluation; ophthalmology and neurology evaluation

## 2024-03-07 NOTE — HISTORY OF PRESENT ILLNESS
[FreeTextEntry1] : neurofibromas  [de-identified] : HUSEYIN PANTOJA is a 32 year year old female w/ NF1 who presents for: \par  1. Neurofibromatosis type 1. Follows with pcp, nuerology, ophtho. No new neurofibromas, no lesions that are symptomatic, growing or changing. Patient previously had lesions excised by plastic surgery.\par  2. FBSE, no personal hx of skin cancer. Adopted. Does not know family hx. \par  History:\par  No history of neurologic symptoms.\par  Adopted does not know family hx. \par

## 2024-03-07 NOTE — PHYSICAL EXAM
[FreeTextEntry3] : AAOx3, NAD, well-appearing / pleasant Total body skin exam performed within normal limits with the exception of:  limited genital/groin evaluation limited scalp examination  tan brown macules on trunk skin colored to tan fleshy papulonodules on trunk and extremities

## 2024-03-15 ENCOUNTER — APPOINTMENT (OUTPATIENT)
Dept: FAMILY MEDICINE | Facility: CLINIC | Age: 34
End: 2024-03-15
Payer: COMMERCIAL

## 2024-03-15 VITALS
DIASTOLIC BLOOD PRESSURE: 76 MMHG | HEART RATE: 85 BPM | WEIGHT: 158.8 LBS | OXYGEN SATURATION: 99 % | SYSTOLIC BLOOD PRESSURE: 112 MMHG | TEMPERATURE: 98.1 F | HEIGHT: 66 IN | BODY MASS INDEX: 25.52 KG/M2

## 2024-03-15 DIAGNOSIS — Q85.00 NEUROFIBROMATOSIS, UNSPECIFIED: ICD-10-CM

## 2024-03-15 DIAGNOSIS — Z00.00 ENCOUNTER FOR GENERAL ADULT MEDICAL EXAMINATION W/OUT ABNORMAL FINDINGS: ICD-10-CM

## 2024-03-15 PROCEDURE — 99395 PREV VISIT EST AGE 18-39: CPT

## 2024-03-15 PROCEDURE — 36415 COLL VENOUS BLD VENIPUNCTURE: CPT

## 2024-03-15 NOTE — PHYSICAL EXAM
[de-identified] : multiple widespread neurofibromas [Normal] : affect was normal and insight and judgment were intact

## 2024-03-15 NOTE — HISTORY OF PRESENT ILLNESS
[FreeTextEntry1] : CPE [de-identified] : 33 year F presents for annual physical exam. PMH neurofibromatosis, prediabetes. Transfer of care from Dr David Feeling well with no complaints.  Has appt next month with GYN and Neuro UTD with GYN

## 2024-03-15 NOTE — HEALTH RISK ASSESSMENT
[No] : No [0] : 2) Feeling down, depressed, or hopeless: Not at all (0) [Patient reported PAP Smear was normal] : Patient reported PAP Smear was normal [Never] : Never [Excellent] : ~his/her~  mood as  excellent [PHQ-2 Negative - No further assessment needed] : PHQ-2 Negative - No further assessment needed [Audit-CScore] : 0 [de-identified] : yes [de-identified] : healthy, low sugar [[id number]] : 0 [EyeExamDate] : 02/24 [None] : None [Employed] : employed [Fully functional (bathing, dressing, toileting, transferring, walking, feeding)] : Fully functional (bathing, dressing, toileting, transferring, walking, feeding) [Fully functional (using the telephone, shopping, preparing meals, housekeeping, doing laundry, using] : Fully functional and needs no help or supervision to perform IADLs (using the telephone, shopping, preparing meals, housekeeping, doing laundry, using transportation, managing medications and managing finances) [Reports changes in hearing] : Reports no changes in hearing [Reports changes in vision] : Reports no changes in vision [Reports changes in dental health] : Reports no changes in dental health [PapSmearDate] : 01/22 [de-identified] : UTD with dentist

## 2024-03-19 ENCOUNTER — NON-APPOINTMENT (OUTPATIENT)
Age: 34
End: 2024-03-19

## 2024-03-19 LAB
ALBUMIN SERPL ELPH-MCNC: 4.3 G/DL
ALP BLD-CCNC: 73 U/L
ALT SERPL-CCNC: 8 U/L
ANION GAP SERPL CALC-SCNC: 13 MMOL/L
AST SERPL-CCNC: 13 U/L
BASOPHILS # BLD AUTO: 0.05 K/UL
BASOPHILS NFR BLD AUTO: 0.8 %
BILIRUB SERPL-MCNC: 0.2 MG/DL
BUN SERPL-MCNC: 12 MG/DL
CALCIUM SERPL-MCNC: 8.9 MG/DL
CHLORIDE SERPL-SCNC: 104 MMOL/L
CHOLEST SERPL-MCNC: 154 MG/DL
CO2 SERPL-SCNC: 20 MMOL/L
CREAT SERPL-MCNC: 0.59 MG/DL
EGFR: 121 ML/MIN/1.73M2
EOSINOPHIL # BLD AUTO: 0.03 K/UL
EOSINOPHIL NFR BLD AUTO: 0.5 %
ESTIMATED AVERAGE GLUCOSE: 114 MG/DL
GLUCOSE SERPL-MCNC: 81 MG/DL
HBA1C MFR BLD HPLC: 5.6 %
HCT VFR BLD CALC: 37.5 %
HDLC SERPL-MCNC: 74 MG/DL
HGB BLD-MCNC: 11.7 G/DL
IMM GRANULOCYTES NFR BLD AUTO: 0.3 %
IRON SATN MFR SERPL: 9 %
IRON SERPL-MCNC: 38 UG/DL
LDLC SERPL CALC-MCNC: 73 MG/DL
LYMPHOCYTES # BLD AUTO: 1.68 K/UL
LYMPHOCYTES NFR BLD AUTO: 27.6 %
MAN DIFF?: NORMAL
MCHC RBC-ENTMCNC: 27.7 PG
MCHC RBC-ENTMCNC: 31.2 GM/DL
MCV RBC AUTO: 88.9 FL
MONOCYTES # BLD AUTO: 0.5 K/UL
MONOCYTES NFR BLD AUTO: 8.2 %
NEUTROPHILS # BLD AUTO: 3.81 K/UL
NEUTROPHILS NFR BLD AUTO: 62.6 %
NONHDLC SERPL-MCNC: 80 MG/DL
PLATELET # BLD AUTO: 332 K/UL
POTASSIUM SERPL-SCNC: 4.4 MMOL/L
PROT SERPL-MCNC: 6.9 G/DL
RBC # BLD: 4.22 M/UL
RBC # FLD: 14.9 %
SODIUM SERPL-SCNC: 137 MMOL/L
TIBC SERPL-MCNC: 435 UG/DL
TRIGL SERPL-MCNC: 24 MG/DL
TSH SERPL-ACNC: 1.13 UIU/ML
UIBC SERPL-MCNC: 397 UG/DL
WBC # FLD AUTO: 6.09 K/UL

## 2024-04-10 ENCOUNTER — APPOINTMENT (OUTPATIENT)
Dept: NEUROLOGY | Facility: CLINIC | Age: 34
End: 2024-04-10
Payer: COMMERCIAL

## 2024-04-10 VITALS
OXYGEN SATURATION: 98 % | SYSTOLIC BLOOD PRESSURE: 109 MMHG | DIASTOLIC BLOOD PRESSURE: 74 MMHG | WEIGHT: 150 LBS | HEART RATE: 80 BPM | TEMPERATURE: 98 F | BODY MASS INDEX: 24.11 KG/M2 | HEIGHT: 66 IN

## 2024-04-10 DIAGNOSIS — Q85.09: ICD-10-CM

## 2024-04-10 PROCEDURE — 99215 OFFICE O/P EST HI 40 MIN: CPT

## 2024-04-10 PROCEDURE — G2211 COMPLEX E/M VISIT ADD ON: CPT

## 2024-04-10 NOTE — DISCUSSION/SUMMARY
[FreeTextEntry1] : 33 -year-old woman who is here for follow-up of NF1. Given the stability of her subcutaneous neurofibroma findings and nonfocal exam patient will hold off on any further imaging. Patient will follow up with me in 1 year.  I spent the time noted on the day of this patient encounter preparing for, review of medical records,review of pertinent diagnostic studies, providing and documenting the above E/M service and counseling and educate patient on differential, workup, disease course, and treatment/management. Education was provided to the patient during this encounter. All questions and concerns were answered and addressed in detail. The patient verbalized understanding and agreed to plan. Patient was advised to continue to monitor for neurologic symptoms and to notify my office or go to the nearest emergency room if there are any changes. Any orders/referrals and communications were provided as well. Side effects of the above medications were discussed in detail including but not limited to applicable black box warning and teratogenicity as appropriate. Patient was advised to bring previous records to my office.

## 2024-04-10 NOTE — HISTORY OF PRESENT ILLNESS
[FreeTextEntry1] : 33 W who is here for initial consultation of NF1. She does not know her family history as she's adopted. She had no history of seizures, optic glioma. There's neurofibromas all over her body. The last time she had and MRI of brain was when she was in high school and it was normal. 1992 mri of brain was unremarkable. She currently has no symptoms. She has upcoming ophthalmology and dermatology appts.   interval history: she is seeing plastics for removal of neurofibroma. She has had no seizures or vision changes. Her last mRI in 2017 showed no acute changes.   Interval history: Patient has had no seizures or vision changes.  Patient's last MRI in 2017 showed no acute changes.  Interval history: Patient has no seizures or vision changes.  Patient's last MRI in 2021 shows stable 0.6 cm left temporal subcutaneous lesion that is likely a neurofibroma.  No orbital or intracranial lesions identified.  Patient has no medication changes or any new diagnoses since her last visit about a year ago.  Patient is doing well  Interval history April 10, 2024: Patient has no seizures or vision changes.  No medication or new diagnoses since her last visit.

## 2024-04-10 NOTE — PHYSICAL EXAM
[General Appearance - Alert] : alert [General Appearance - Well Developed] : well developed [Oriented To Time, Place, And Person] : oriented to person, place, and time [Person] : oriented to person [Place] : oriented to place [Time] : oriented to time [Short Term Intact] : short term memory intact [Span Intact] : the attention span was normal [Naming Objects] : no difficulty naming common objects [Fluency] : fluency intact [Current Events] : adequate knowledge of current events [Cranial Nerves Oculomotor (III)] : extraocular motion intact [Cranial Nerves Facial (VII)] : face symmetrical [Cranial Nerves Vestibulocochlear (VIII)] : hearing was intact bilaterally [Cranial Nerves Accessory (XI - Cranial And Spinal)] : head turning and shoulder shrug symmetric [Cranial Nerves Hypoglossal (XII)] : there was no tongue deviation with protrusion [Paresis Pronator Drift Right-Sided] : no pronator drift on the right [Paresis Pronator Drift Left-Sided] : no pronator drift on the left [Abnormal Walk] : normal gait

## 2024-04-18 ENCOUNTER — APPOINTMENT (OUTPATIENT)
Dept: OBGYN | Facility: CLINIC | Age: 34
End: 2024-04-18
Payer: COMMERCIAL

## 2024-04-18 VITALS
HEIGHT: 66 IN | WEIGHT: 156 LBS | BODY MASS INDEX: 25.07 KG/M2 | DIASTOLIC BLOOD PRESSURE: 76 MMHG | HEART RATE: 94 BPM | SYSTOLIC BLOOD PRESSURE: 108 MMHG

## 2024-04-18 DIAGNOSIS — Z01.419 ENCOUNTER FOR GYNECOLOGICAL EXAMINATION (GENERAL) (ROUTINE) W/OUT ABNORMAL FINDINGS: ICD-10-CM

## 2024-04-18 PROCEDURE — 99395 PREV VISIT EST AGE 18-39: CPT

## 2024-04-21 LAB — HPV HIGH+LOW RISK DNA PNL CVX: NOT DETECTED

## 2024-04-21 NOTE — HISTORY OF PRESENT ILLNESS
[FreeTextEntry1] : 33 year old P0 LMP: 04/07/24 presents for annual gyn visit. Pt feels well and has no complaints. She has normal menses. She denies intermenstrual bleeding, itching, burning, or abnormal discharge.    [Condoms] : uses condoms [Y] : Patient is sexually active [PapSmeardate] : 05/2021

## 2024-04-21 NOTE — DISCUSSION/SUMMARY
[FreeTextEntry1] : This note was written by Gabby Camargo on 04/18/2024 actively solely Sri Sawant M.D.   All medical record entries made by this scribe at my, Sri Sawant M.D direction and personally dictated by me on 04/18/2024 . I have personally reviewed the chart and agree that the record reflects my personal performance of the history, physical exam, assessment, and plan.

## 2024-04-21 NOTE — PHYSICAL EXAM
[Chaperone Present] : A chaperone was present in the examining room during all aspects of the physical examination [Appropriately responsive] : appropriately responsive [Alert] : alert [No Acute Distress] : no acute distress [No Lymphadenopathy] : no lymphadenopathy [Soft] : soft [Non-tender] : non-tender [Non-distended] : non-distended [No HSM] : No HSM [No Lesions] : no lesions [No Mass] : no mass [Oriented x3] : oriented x3 [Examination Of The Breasts] : a normal appearance [No Discharge] : no discharge [No Masses] : no breast masses were palpable [Labia Majora] : normal [Labia Minora] : normal [Normal] : normal [Uterine Adnexae] : normal [[id number]] : A chaperone was present during the pelvic exam. [FreeTextEntry2] : SOFIA [place]

## 2024-04-21 NOTE — PLAN
[FreeTextEntry1] : - HPV/ PAP done today. - Pt given information for egg preservation.  - RTO for 1 year annual.

## 2024-04-28 LAB — CYTOLOGY CVX/VAG DOC THIN PREP: NORMAL

## 2024-05-02 ENCOUNTER — APPOINTMENT (OUTPATIENT)
Dept: NUTRITION | Facility: CLINIC | Age: 34
End: 2024-05-02
Payer: COMMERCIAL

## 2024-05-02 PROCEDURE — 97802 MEDICAL NUTRITION INDIV IN: CPT | Mod: 95

## 2024-05-20 ENCOUNTER — APPOINTMENT (OUTPATIENT)
Dept: OPHTHALMOLOGY | Facility: CLINIC | Age: 34
End: 2024-05-20
Payer: COMMERCIAL

## 2024-05-20 ENCOUNTER — NON-APPOINTMENT (OUTPATIENT)
Age: 34
End: 2024-05-20

## 2024-05-20 PROCEDURE — 92014 COMPRE OPH EXAM EST PT 1/>: CPT

## 2024-05-20 PROCEDURE — 92250 FUNDUS PHOTOGRAPHY W/I&R: CPT

## 2024-06-19 ENCOUNTER — APPOINTMENT (OUTPATIENT)
Dept: NUTRITION | Facility: CLINIC | Age: 34
End: 2024-06-19
Payer: COMMERCIAL

## 2024-06-19 PROCEDURE — 97803 MED NUTRITION INDIV SUBSEQ: CPT | Mod: 95

## 2024-07-26 ENCOUNTER — APPOINTMENT (OUTPATIENT)
Dept: HUMAN REPRODUCTION | Facility: CLINIC | Age: 34
End: 2024-07-26
Payer: COMMERCIAL

## 2024-07-26 PROCEDURE — 36415 COLL VENOUS BLD VENIPUNCTURE: CPT

## 2024-07-26 PROCEDURE — 99203 OFFICE O/P NEW LOW 30 MIN: CPT | Mod: 25

## 2024-07-26 PROCEDURE — 99205 OFFICE O/P NEW HI 60 MIN: CPT | Mod: 25

## 2024-07-26 PROCEDURE — 76830 TRANSVAGINAL US NON-OB: CPT

## 2024-08-27 ENCOUNTER — NON-APPOINTMENT (OUTPATIENT)
Age: 34
End: 2024-08-27

## 2024-08-28 ENCOUNTER — INPATIENT (INPATIENT)
Facility: HOSPITAL | Age: 34
LOS: 1 days | Discharge: HOME | DRG: 603 | End: 2024-08-30
Attending: STUDENT IN AN ORGANIZED HEALTH CARE EDUCATION/TRAINING PROGRAM | Admitting: STUDENT IN AN ORGANIZED HEALTH CARE EDUCATION/TRAINING PROGRAM
Payer: COMMERCIAL

## 2024-08-28 VITALS
OXYGEN SATURATION: 97 % | TEMPERATURE: 99 F | DIASTOLIC BLOOD PRESSURE: 74 MMHG | SYSTOLIC BLOOD PRESSURE: 108 MMHG | HEIGHT: 66 IN | WEIGHT: 154.98 LBS | HEART RATE: 106 BPM | RESPIRATION RATE: 22 BRPM

## 2024-08-28 DIAGNOSIS — Z29.9 ENCOUNTER FOR PROPHYLACTIC MEASURES, UNSPECIFIED: ICD-10-CM

## 2024-08-28 DIAGNOSIS — Q85.00 NEUROFIBROMATOSIS, UNSPECIFIED: ICD-10-CM

## 2024-08-28 DIAGNOSIS — D36.10 BENIGN NEOPLASM OF PERIPHERAL NERVES AND AUTONOMIC NERVOUS SYSTEM, UNSPECIFIED: Chronic | ICD-10-CM

## 2024-08-28 DIAGNOSIS — L03.221 CELLULITIS OF NECK: ICD-10-CM

## 2024-08-28 LAB
ALBUMIN SERPL ELPH-MCNC: 4.3 G/DL — SIGNIFICANT CHANGE UP (ref 3.3–5)
ALP SERPL-CCNC: 70 U/L — SIGNIFICANT CHANGE UP (ref 40–120)
ALT FLD-CCNC: 10 U/L — SIGNIFICANT CHANGE UP (ref 10–45)
ANION GAP SERPL CALC-SCNC: 12 MMOL/L — SIGNIFICANT CHANGE UP (ref 5–17)
AST SERPL-CCNC: 16 U/L — SIGNIFICANT CHANGE UP (ref 10–40)
BASOPHILS # BLD AUTO: 0.05 K/UL — SIGNIFICANT CHANGE UP (ref 0–0.2)
BASOPHILS NFR BLD AUTO: 0.4 % — SIGNIFICANT CHANGE UP (ref 0–2)
BILIRUB SERPL-MCNC: 0.5 MG/DL — SIGNIFICANT CHANGE UP (ref 0.2–1.2)
BUN SERPL-MCNC: 8 MG/DL — SIGNIFICANT CHANGE UP (ref 7–23)
CALCIUM SERPL-MCNC: 9.6 MG/DL — SIGNIFICANT CHANGE UP (ref 8.4–10.5)
CHLORIDE SERPL-SCNC: 102 MMOL/L — SIGNIFICANT CHANGE UP (ref 96–108)
CO2 SERPL-SCNC: 20 MMOL/L — LOW (ref 22–31)
CREAT SERPL-MCNC: 0.57 MG/DL — SIGNIFICANT CHANGE UP (ref 0.5–1.3)
EGFR: 122 ML/MIN/1.73M2 — SIGNIFICANT CHANGE UP
EOSINOPHIL # BLD AUTO: 0 K/UL — SIGNIFICANT CHANGE UP (ref 0–0.5)
EOSINOPHIL NFR BLD AUTO: 0 % — SIGNIFICANT CHANGE UP (ref 0–6)
GLUCOSE SERPL-MCNC: 83 MG/DL — SIGNIFICANT CHANGE UP (ref 70–99)
HCG SERPL-ACNC: <2 MIU/ML — SIGNIFICANT CHANGE UP
HCT VFR BLD CALC: 43 % — SIGNIFICANT CHANGE UP (ref 34.5–45)
HGB BLD-MCNC: 13.8 G/DL — SIGNIFICANT CHANGE UP (ref 11.5–15.5)
IMM GRANULOCYTES NFR BLD AUTO: 0.3 % — SIGNIFICANT CHANGE UP (ref 0–0.9)
LYMPHOCYTES # BLD AUTO: 1.4 K/UL — SIGNIFICANT CHANGE UP (ref 1–3.3)
LYMPHOCYTES # BLD AUTO: 11.8 % — LOW (ref 13–44)
MCHC RBC-ENTMCNC: 30.3 PG — SIGNIFICANT CHANGE UP (ref 27–34)
MCHC RBC-ENTMCNC: 32.1 GM/DL — SIGNIFICANT CHANGE UP (ref 32–36)
MCV RBC AUTO: 94.3 FL — SIGNIFICANT CHANGE UP (ref 80–100)
MONOCYTES # BLD AUTO: 0.69 K/UL — SIGNIFICANT CHANGE UP (ref 0–0.9)
MONOCYTES NFR BLD AUTO: 5.8 % — SIGNIFICANT CHANGE UP (ref 2–14)
NEUTROPHILS # BLD AUTO: 9.69 K/UL — HIGH (ref 1.8–7.4)
NEUTROPHILS NFR BLD AUTO: 81.7 % — HIGH (ref 43–77)
NRBC # BLD: 0 /100 WBCS — SIGNIFICANT CHANGE UP (ref 0–0)
PLATELET # BLD AUTO: 338 K/UL — SIGNIFICANT CHANGE UP (ref 150–400)
POTASSIUM SERPL-MCNC: 4 MMOL/L — SIGNIFICANT CHANGE UP (ref 3.5–5.3)
POTASSIUM SERPL-SCNC: 4 MMOL/L — SIGNIFICANT CHANGE UP (ref 3.5–5.3)
PROT SERPL-MCNC: 7.9 G/DL — SIGNIFICANT CHANGE UP (ref 6–8.3)
RBC # BLD: 4.56 M/UL — SIGNIFICANT CHANGE UP (ref 3.8–5.2)
RBC # FLD: 12.5 % — SIGNIFICANT CHANGE UP (ref 10.3–14.5)
S PYO AG SPEC QL IA: NEGATIVE — SIGNIFICANT CHANGE UP
SODIUM SERPL-SCNC: 134 MMOL/L — LOW (ref 135–145)
WBC # BLD: 11.87 K/UL — HIGH (ref 3.8–10.5)
WBC # FLD AUTO: 11.87 K/UL — HIGH (ref 3.8–10.5)

## 2024-08-28 PROCEDURE — 99223 1ST HOSP IP/OBS HIGH 75: CPT | Mod: GC

## 2024-08-28 PROCEDURE — 99285 EMERGENCY DEPT VISIT HI MDM: CPT

## 2024-08-28 PROCEDURE — 70491 CT SOFT TISSUE NECK W/DYE: CPT | Mod: 26,MC

## 2024-08-28 RX ORDER — SODIUM CHLORIDE 9 MG/ML
1000 INJECTION INTRAMUSCULAR; INTRAVENOUS; SUBCUTANEOUS ONCE
Refills: 0 | Status: COMPLETED | OUTPATIENT
Start: 2024-08-28 | End: 2024-08-28

## 2024-08-28 RX ORDER — FERROUS SULFATE 325(65) MG
1 TABLET ORAL
Refills: 0 | DISCHARGE

## 2024-08-28 RX ORDER — DEXAMETHASONE 0.75 MG
10 TABLET ORAL ONCE
Refills: 0 | Status: COMPLETED | OUTPATIENT
Start: 2024-08-28 | End: 2024-08-28

## 2024-08-28 RX ORDER — FERROUS SULFATE 325(65) MG
325 TABLET ORAL
Refills: 0 | Status: DISCONTINUED | OUTPATIENT
Start: 2024-08-28 | End: 2024-08-30

## 2024-08-28 RX ORDER — AMPICILLIN SODIUM AND SULBACTAM SODIUM 1; .5 G/1; G/1
3 INJECTION, POWDER, FOR SOLUTION INTRAMUSCULAR; INTRAVENOUS EVERY 6 HOURS
Refills: 0 | Status: DISCONTINUED | OUTPATIENT
Start: 2024-08-28 | End: 2024-08-30

## 2024-08-28 RX ORDER — DEXAMETHASONE 0.75 MG
10 TABLET ORAL EVERY 8 HOURS
Refills: 0 | Status: COMPLETED | OUTPATIENT
Start: 2024-08-29 | End: 2024-08-29

## 2024-08-28 RX ORDER — AMPICILLIN SODIUM AND SULBACTAM SODIUM 1; .5 G/1; G/1
3 INJECTION, POWDER, FOR SOLUTION INTRAMUSCULAR; INTRAVENOUS ONCE
Refills: 0 | Status: COMPLETED | OUTPATIENT
Start: 2024-08-28 | End: 2024-08-28

## 2024-08-28 RX ADMIN — Medication 102 MILLIGRAM(S): at 21:04

## 2024-08-28 RX ADMIN — SODIUM CHLORIDE 1000 MILLILITER(S): 9 INJECTION INTRAMUSCULAR; INTRAVENOUS; SUBCUTANEOUS at 17:00

## 2024-08-28 RX ADMIN — SODIUM CHLORIDE 1000 MILLILITER(S): 9 INJECTION INTRAMUSCULAR; INTRAVENOUS; SUBCUTANEOUS at 15:44

## 2024-08-28 RX ADMIN — AMPICILLIN SODIUM AND SULBACTAM SODIUM 200 GRAM(S): 1; .5 INJECTION, POWDER, FOR SOLUTION INTRAMUSCULAR; INTRAVENOUS at 18:22

## 2024-08-28 NOTE — ED ADULT NURSE NOTE - NSICDXPASTMEDICALHX_GEN_ALL_CORE_FT
PAST MEDICAL HISTORY:  Cafe-au-lait spots     History of pneumonia age 2    Neurofibroma of thigh 13 cm - right thigh    Neurofibromatosis

## 2024-08-28 NOTE — H&P ADULT - PROBLEM SELECTOR PLAN 1
Patient presents with anterior neck swelling, skin changes, and warmth, labs with mild leukocytosis   - CT showing cellulitis and myositis at floor of the mouth with associated necrotic lymph node  - ENT following, appreciate recs  - Lymphadenopathy present, likely reactive, but ENT rec follow up outpatient for repeat CT   - Protecting airway currently   - Cont decadron 10 q8 for 24 hours  - Cont IV unasyn  - f/u infectious workup including cultures, rapid strep, mono  - F/u ENT recs in morning Patient presents with anterior neck swelling, skin changes, and warmth, labs with mild leukocytosis   - CT showing cellulitis and myositis at floor of the mouth with associated necrotic lymph node  - ENT following, appreciate recs  - Lymphadenopathy present, likely reactive, but ENT rec follow up outpatient for repeat CT   - Protecting airway currently   - Cont decadron 10 q8 for 24 hours  - Cont IV unasyn  - f/u infectious workup including strep culture, rapid strep, mono  - F/u ENT recs in morning Patient presents with anterior neck swelling, skin changes, and warmth, labs with mild leukocytosis   - CT showing cellulitis and myositis at floor of the mouth with associated necrotic lymph node  - ENT following, appreciate recs  - Protecting airway currently   - Cont decadron 10 q8 for 24 hours  - Cont IV unasyn  - f/u infectious workup including strep culture, rapid strep, mono  - ID consult in morning per ENT  - US guided FNA biopsy of necrotic lymph node   - F/u ENT recs in morning Patient presents with anterior neck swelling, skin changes, and warmth, labs with mild leukocytosis   - CT showing cellulitis and myositis at floor of the mouth with associated necrotic lymph node  - ENT following, appreciate recs  - Protecting airway currently   - Cont decadron 10 q8 for 24 hours  - Cont IV unasyn  - f/u infectious workup including strep culture, rapid strep, mono  - ID consult in morning per ENT  - US guided FNA biopsy of necrotic lymph node, d/w ENT in AM  - F/u ENT recs in morning Patient presents with anterior neck swelling, skin changes, and warmth, labs with mild leukocytosis   - CT showing cellulitis and myositis at floor of the mouth with associated necrotic lymph node  - ENT following, appreciate recs   - Protecting airway currently   - Cont decadron 10 q8 for 24 hours  - Cont IV unasyn  - f/u infectious workup including strep culture, rapid strep, mono  - ID consult in morning per ENT  - US guided FNA biopsy of necrotic lymph node, d/w ENT in AM  - F/u ENT recs in morning

## 2024-08-28 NOTE — H&P ADULT - NSHPADDITIONALINFOADULT_GEN_ALL_CORE
please provide full data to patient on eventual discharge to ensure continued outpatient follow-up of abnormal findings including but not limited to:  CT Neck Soft Tissue IC: There is soft tissue stranding at the floor of the mouth, with thickening of the muscular sling. The appearance is suspicious for cellulitis/myositis; reactive lymph nodes adjacent to the lesion. There is a poorly marginated necrotic node beneath the hyoid bone measuring 1.0 x 1.3 x 2.0 cm. The appearance is somewhat aggressive, and clinical correlation will be required to exclude Alli's angina; straightening of the normal cervical lordosis, which could be congenital or related to muscle spasm. There is mild reversal centered at C7. There are small incidental subcutaneous lesions in the left dorsal neck and left temporalis fossa, consistent with the known diagnosis of neurofibromatosis.

## 2024-08-28 NOTE — ED PROVIDER NOTE - OBJECTIVE STATEMENT
33-year-old female PMhx neurofibromatosis presents to ED complaining of swelling to front of her neck since yesterday.  First noted some mild swelling to front/right part of her neck last night, woke up this morning and swelling was increased, went to urgent care was sent to ED for evaluation.  States she did have a mild sore throat earlier last week that is since resolved.  Denies any pain to the area, difficulty swallowing, difficulty breathing, chest pain, fever/chills, vomiting, difficulty swallowing, current sore throat. 33-year-old female PMhx neurofibromatosis presents to ED complaining of swelling to front of her neck since yesterday.  First noted some mild swelling to front/right part of her neck last night, woke up this morning and swelling was increased, went to urgent care was sent to ED for evaluation.  States she did have a mild sore throat earlier last week that is since resolved.  Denies any pain to the area, difficulty swallowing, difficulty breathing, chest pain, fever/chills, vomiting, difficulty swallowing, current sore throat.        Attending note.  Patient was seen in room #33 to the left.  Patient complaining of nontender pain in the anterior aspect of the neck which began yesterday and was more prominent today.  She denies any fevers, chills, sweats.  Denies any difficulty swallowing or breathing.  There is been no change in her voice.  She denies any previous episodes.

## 2024-08-28 NOTE — ED ADULT TRIAGE NOTE - CHIEF COMPLAINT QUOTE
palpable mass anterior neck x last night, larger this morning, (denies fever/chills, +cough last week), hx neurofibromatosis - follows with neuro yearly, denies airway issues/talking in full sentences, +c/o pressure when swallowing

## 2024-08-28 NOTE — ED PROVIDER NOTE - CLINICAL SUMMARY MEDICAL DECISION MAKING FREE TEXT BOX
Attending note.  Patient with nontender soft tissue swelling/mass in the submental area and anterior neck differential not limited to thyroglossal duct cyst versus adenopathy versus thyroid nodule.  CT with contrast of soft tissue neck, labs and reassess.

## 2024-08-28 NOTE — ED ADULT NURSE NOTE - OBJECTIVE STATEMENT
Pt is a 33 yr old female coming from home for a neck lump. Pt states she has had the lump for a "while" but last night it increased in size. Pt states she is able to breathe and can swallow with no issues- lump is firm. Pt denies fevers. Pt is a/ox 3- vitals stable.

## 2024-08-28 NOTE — ED PROVIDER NOTE - PHYSICAL EXAMINATION
Attending note.  Patient is alert in no acute distress.  Dentition appears to be normal without pain or tenderness.  Patient has slightly enlarged tonsils with small amounts of exudates.  There is no submandibular or cervical adenopathy.  Patient has a midline submental soft tissue mass which is nontender.  Some we will space is soft and nontender.  Patient is able to thrust her tongue and move it without pain.  There is no stridor.

## 2024-08-28 NOTE — CONSULT NOTE ADULT - PROBLEM SELECTOR RECOMMENDATION 9
- Imaging reviewed and case discussed with attending Dr Franks   - Rec CDU for overnight observation   - please check  rapid strep and mono   - rec IV unasyn  - rec IV decadron 10mg Q8H x 24 hours.   - For the abnormal lymph node findings, likely reactive, will need outpatient ENT and CT scan follow up to determine whether needs biopsy.   - ENT will follow up tomorrow morning - Imaging reviewed and case discussed with attending Dr Franks   - Rec CDU for overnight observation   - please check  rapid strep and mono   - rec IV unasyn  - rec IV decadron 10mg Q8H x 24 hours.   - For the abnormal lymph node findings, likely reactive, will need outpatient ENT and CT scan follow up to determine whether needs biopsy.   - ENT will follow up tomorrow morning  - please page or call [phone] if concerns or issue. - Imaging reviewed and case discussed with attending Dr Franks   - Rec admission to medicine  - please check  rapid strep and mono   - rec IV unasyn for now, and ID consulted for further evaluation   - rec IV decadron 10mg Q8H x 24 hours.   - rec US guided FNA for biopsy of lymph node.   - ENT will follow up tomorrow morning  - please page or call [phone] if concerns or issue.

## 2024-08-28 NOTE — ED ADULT NURSE REASSESSMENT NOTE - SYMPTOMS
"pressure to throat" "pressure to throat", maintaining airway, able to swallow without difficulty except slight discomfort

## 2024-08-28 NOTE — H&P ADULT - PROBLEM SELECTOR PLAN 3
Diet: regular  DVT: None  Dispo: Medicine floor pending further ENT eval in morning f/u screening EKG (not present in MUSE or commented upon by ED team)   Diet: Regular   DVT: SCDs  Dispo: Medicine floor pending further ENT eval in morning  Outpt f/u abn imaging findings as below f/u screening EKG (not present in MUSE or commented upon by ED team)   Diet: Regular   DVT: encourage ambulation  Dispo: Medicine floor pending further ENT eval in morning  Outpt f/u abn imaging findings as below

## 2024-08-28 NOTE — CONSULT NOTE ADULT - SUBJECTIVE AND OBJECTIVE BOX
CC:    HPI:       PAST MEDICAL & SURGICAL HISTORY:  Neurofibroma of thigh  13 cm - right thigh      Neurofibromatosis      Cafe-au-lait spots      History of pneumonia  age 2      No significant past surgical history        Allergies    No Known Allergies    Intolerances      MEDICATIONS  (STANDING):    MEDICATIONS  (PRN):      Social History: **??**    Family history: **??**    ROS:   ENT: all negative except as noted in HPI   CV: denies palpitations  Pulm: denies SOB, cough, hemoptysis  GI: denies change in appetite, indigestion, n/v  : denies pertinent urinary symptoms, urgency  Neuro: denies numbness/tingling, loss of sensation  Psych: denies anxiety  MS: denies muscle weakness, instability  Heme: denies easy bruising or bleeding  Endo: denies heat/cold intolerance, excessive sweating  Vascular: denies LE edema    Vital Signs Last 24 Hrs  T(C): 36.8 (28 Aug 2024 16:38), Max: 37.1 (28 Aug 2024 13:08)  T(F): 98.3 (28 Aug 2024 16:38), Max: 98.7 (28 Aug 2024 13:08)  HR: 84 (28 Aug 2024 16:38) (84 - 106)  BP: 112/72 (28 Aug 2024 16:38) (108/74 - 112/72)  BP(mean): --  RR: 20 (28 Aug 2024 16:38) (20 - 22)  SpO2: 100% (28 Aug 2024 16:38) (97% - 100%)    Parameters below as of 28 Aug 2024 16:38  Patient On (Oxygen Delivery Method): room air                              13.8   11.87 )-----------( 338      ( 28 Aug 2024 15:32 )             43.0    08-28    134<L>  |  102  |  8   ----------------------------<  83  4.0   |  20<L>  |  0.57    Ca    9.6      28 Aug 2024 15:32    TPro  7.9  /  Alb  4.3  /  TBili  0.5  /  DBili  x   /  AST  16  /  ALT  10  /  AlkPhos  70  08-28       PHYSICAL EXAM:  Gen: NAD  Skin: No rashes, bruises, or lesions  Head: Normocephalic, Atraumatic  Face: no edema, erythema, or fluctuance. Parotid glands soft without mass  Eyes: no scleral injection  Ears: Right: ear canal clear, TM intact without effusion or erythema. No evidence of any fluid drainage. No mastoid tenderness, erythema, or ear bulging            Left: ear canal clear, TM intact without effusion or erythema. No evidence of any fluid drainage. No mastoid tenderness, erythema, or ear bulging  Nose: Nares bilaterally patent, no discharge  Mouth: No Stridor / Drooling / Trismus.  Mucosa moist, tongue/uvula midline, oropharynx clear  Neck: Flat, supple, no lymphadenopathy, trachea midline, no masses  Lymphatic: No lymphadenopathy  Resp: breathing easily, no stridor  CV: no peripheral edema/cyanosis  GI: nondistended   Peripheral vascular: no JVD or edema  Neuro: facial nerve intact, no facial droop      Diagnostic Nasal Endoscopy: (Scope #2 used)    Fiberoptic Indirect laryngoscopy:  (Scope #2 used)    IMAGING/ADDITIONAL STUDIES:  CC: anterior neck swelling     HPI: 33 year old, Female, with PMHx of neurofibromatosis, presented to ED complaning of anterior neck swelling for 2 days. Labs at ED is significant for leucocysotis at 11.87. CT neck revealed aggressive cellulitis/myositis at floor of mouth, with an associated necrotic lymph node. Correlate clinically to exclude Alli's angina. ENT consulted to evaluate airway and to rule out Alli/s angina. Per patient, she has mild sore throat earlier last week, but has since resolved. Endores mild tenderness when she palpated her anterior neck. Otherwise, no difficulty tolerating diet, tolerating oral secretion. Also denies difficulty swallowing, shortness of breath, or voice change. Denies recent dental procedure, or trauma to mouth. Currently, pt denies F/C/N/V, rhinorrhea, odynophagia, dysphonia, changes in voice.     PAST MEDICAL & SURGICAL HISTORY:  Neurofibroma of thigh  13 cm - right thigh    Neurofibromatosis    Cafe-au-lait spots    History of pneumonia  age 2    No significant past surgical history    Allergies  No Known Allergies    MEDICATIONS  (STANDING):  MEDICATIONS  (PRN):    Social History: social drinker. no history of smoking   Family history: no pertinent family history     ROS:   ENT: all negative except as noted in HPI   CV: denies palpitations  Pulm: denies SOB, cough, hemoptysis  GI: denies change in appetite, indigestion, n/v  : denies pertinent urinary symptoms, urgency  Neuro: denies numbness/tingling, loss of sensation  Psych: denies anxiety  MS: denies muscle weakness, instability  Heme: denies easy bruising or bleeding  Endo: denies heat/cold intolerance, excessive sweating  Vascular: denies LE edema    Vital Signs Last 24 Hrs  T(C): 36.8 (28 Aug 2024 16:38), Max: 37.1 (28 Aug 2024 13:08)  T(F): 98.3 (28 Aug 2024 16:38), Max: 98.7 (28 Aug 2024 13:08)  HR: 84 (28 Aug 2024 16:38) (84 - 106)  BP: 112/72 (28 Aug 2024 16:38) (108/74 - 112/72)  BP(mean): --  RR: 20 (28 Aug 2024 16:38) (20 - 22)  SpO2: 100% (28 Aug 2024 16:38) (97% - 100%)    Parameters below as of 28 Aug 2024 16:38  Patient On (Oxygen Delivery Method): room air                    13.8   11.87 )-----------( 338      ( 28 Aug 2024 15:32 )             43.0    08-28    134<L>  |  102  |  8   ----------------------------<  83  4.0   |  20<L>  |  0.57    Ca    9.6      28 Aug 2024 15:32    TPro  7.9  /  Alb  4.3  /  TBili  0.5  /  DBili  x   /  AST  16  /  ALT  10  /  AlkPhos  70  08-28    PHYSICAL EXAM:  Gen: NAD  Skin: No rashes, bruises, or lesions  Head: Normocephalic, Atraumatic  Face: no edema, erythema, or fluctuance. Parotid glands soft without mass  Eyes: no scleral injection  Nose: Nares bilaterally patent, no discharge  Mouth: floor of mouth soft. no tongue swelling or tongue elevation. a small white exudate noted on right tonsil. No Stridor / Drooling / Trismus.  Mucosa moist, tongue/uvula midline, oropharynx clear  Neck: anterior neck swelling with mild TTP.   Lymphatic: b/l neck lymphadenopathy  Resp: breathing easily, no stridor  CV: no peripheral edema/cyanosis  GI: nondistended   Peripheral vascular: no JVD or edema  Neuro: facial nerve intact, no facial droop    #IMAGING  ACC: [id number] EXAM: CT NECK SOFT TISSUE IC ORDERED BY: CHRISTINA TORIBIO  PROCEDURE DATE: 08/28/2024    INTERPRETATION: EXAMINATION: CT NECK SOFT TISSUE WITH IV CONTRAST  CLINICAL INDICATION: R neck swelling, eval. History of neurofibromatosis.    FINDINGS:  There is soft tissue stranding at the floor of the mouth, with thickening of the muscular sling. The appearance is suspicious for cellulitis/myositis. There is no subcutaneous gas or an organized fluid collection at this time. There are reactive lymph nodes adjacent to the lesion. There is a poorly marginated necrotic node beneath the hyoid bone measuring 1.0 x 1.3 x 2.0 cm. The appearance is somewhat aggressive, and clinical correlation will be required to exclude Alli's angina.    There is straightening of the normal cervical lordosis, which could be congenital or related to muscle spasm. There is mild reversal centered at C7. There are small incidental subcutaneous lesions in the left dorsal neck and left temporalis fossa, consistent with the known diagnosis of neurofibromatosis.    The oral cavity, dentition, sublingual, submandibular, nasopharyngeal, pharyngeal, , and retropharyngeal spaces are otherwise negative. The parotids, submandibular, and thyroid glands are otherwise negative. The parapharyngeal fat, carotid spaces, posterior triangles, paraspinal musculature, epiglottis, and larynx appear otherwise unremarkable. Limited views of the brain parenchyma, skull base, skull base foramina, and visualized upper chest are otherwise negative. No perineural spread of disease. No drainable fluid collections or an abscess.    IMPRESSION:  1. Aggressive cellulitis/myositis at the floor of the mouth, with an associated necrotic lymph node.  2. Correlate clinically to exclude Alli's angina.  3. Small incidental neurofibromas.    Patient Name: HUSEYIN PANTOJA  MRN: [id number], Accession: [id number]  DOS: 08/28/24 05:23 PM    --- End of Report ---    #PROCEDURE   Fiberoptic Flexible Laryngoscopy (Ambu scope used):  Reason for Laryngoscopy: airway evaluation   Patient was unable to cooperate with mirror.    Nasopharynx, oropharynx, and hypopharynx clear, no bleeding. Tongue base, posterior pharyngeal wall, vallecula, epiglottis, and subglottis appear normal. No erythema, edema, pooling of secretions, masses or lesions. Airway patent, no foreign body visualized. No glottic/supraglottic edema. True vocal cords, arytenoids, vestibular folds, ventricles, pyriform sinuses, and aryepiglottic folds appear normal bilaterally. Vocal cords mobile with good contact b/l.

## 2024-08-28 NOTE — H&P ADULT - NSHPPHYSICALEXAM_GEN_ALL_CORE
T(C): 36.8 (08-28-24 @ 16:38), Max: 37.1 (08-28-24 @ 13:08)  HR: 84 (08-28-24 @ 16:38) (84 - 106)  BP: 112/72 (08-28-24 @ 16:38) (108/74 - 112/72)  RR: 20 (08-28-24 @ 16:38) (20 - 22)  SpO2: 100% (08-28-24 @ 16:38) (97% - 100%)    CONSTITUTIONAL: Well groomed, no apparent distress  EYES: PERRLA and symmetric, EOMI, No conjunctival or scleral injection, non-icteric  ENMT: Oral mucosa with moist membranes. Normal dentition; no pharyngeal injection or exudates             NECK: Supple, symmetric and without tracheal deviation   RESP: No respiratory distress, no use of accessory muscles; CTA b/l, no WRR  CV: RRR, +S1S2, no MRG; no JVD; no peripheral edema  GI: Soft, NT, ND, no rebound, no guarding; no palpable masses; no hepatosplenomegaly; no hernia palpated  LYMPH: No cervical LAD or tenderness; no axillary LAD or tenderness; no inguinal LAD or tenderness  MSK: Normal gait; No digital clubbing or cyanosis; examination of the (head/neck/spine/ribs/pelvis, RUE, LUE, RLE, LLE) without misalignment,            Normal ROM without pain, no spinal tenderness, normal muscle strength/tone  SKIN: No rashes or ulcers noted; no subcutaneous nodules or induration palpable  NEURO: CN II-XII intact; normal reflexes in upper and lower extremities, sensation intact in upper and lower extremities b/l to light touch   PSYCH: Appropriate insight/judgment; A+O x 3, mood and affect appropriate, recent/remote memory intact T(C): 36.8 (08-28-24 @ 16:38), Max: 37.1 (08-28-24 @ 13:08)  HR: 84 (08-28-24 @ 16:38) (84 - 106)  BP: 112/72 (08-28-24 @ 16:38) (108/74 - 112/72)  RR: 20 (08-28-24 @ 16:38) (20 - 22)  SpO2: 100% (08-28-24 @ 16:38) (97% - 100%)    CONSTITUTIONAL: Well groomed, no apparent distress  EYES: PERRLA and symmetric, EOMI, No conjunctival or scleral injection, non-icteric  ENMT: Oral mucosa with moist membranes. Normal dentition; no pharyngeal injection or exudates  NECK: Swelling of anterior neck with associated skin changes and warmth c/w cellulitis, tender to palpation, no drainage or open wound   RESP: No respiratory distress, no use of accessory muscles; CTA b/l, no WRR  CV: RRR, +S1S2, no MRG; no JVD; no peripheral edema  GI: Soft, NT, ND, no rebound, no guarding; no palpable masses; no hepatosplenomegaly; no hernia palpated  LYMPH: No cervical LAD or tenderness  MSK: Grossly intact   SKIN: Diffuse nodules over entire body c/w hx NF1  NEURO: Nonfocal  PSYCH: Appropriate insight/judgment; A+O x 3, mood and affect appropriate, recent/remote memory intact

## 2024-08-28 NOTE — H&P ADULT - PROBLEM SELECTOR PLAN 2
- Several nodules over the body noted on exam  - Has had larger nodule over right hip, other areas surgically removed in past - Several nodules over the body noted on exam  - Has had larger nodule over right hip, other areas surgically removed in past  - continued outpatient f/u

## 2024-08-28 NOTE — H&P ADULT - HISTORY OF PRESENT ILLNESS
Ms. Douglass is a 32 yo F with PMHx of neurofibromatosis who presented to ED complaining of anterior neck swelling for 2 days. Labs at ED is significant for leukocytosis at 11.87. CT neck revealed aggressive cellulitis/myositis at floor of mouth, with an associated necrotic lymph node. Correlate clinically to exclude Alli's angina. ENT consulted to evaluate airway and to rule out Alli/s angina. Per patient, she has mild sore throat earlier last week, but has since resolved. Endores mild tenderness when she palpated her anterior neck. Otherwise, no difficulty tolerating diet, tolerating oral secretion. Also denies difficulty swallowing, shortness of breath, or voice change. Denies recent dental procedure, or trauma to mouth. Currently, pt denies F/C/N/V, rhinorrhea, odynophagia, dysphonia, changes in voice.  Ms. Douglass is a 34 yo F with neurofibromatosis who presented to ED complaining of anterior neck swelling for 2 days. Patient was in her normal state of health until about two days ago when she noticed swelling of her anterior neck and under jaw. She had mild tenderness but did not think much of it. Se has mild sore throat earlier last week, but has since resolved. Endores mild tenderness when she palpated her anterior neck. Otherwise, no difficulty tolerating diet, tolerating oral secretion. Also denies difficulty swallowing, shortness of breath, or voice change. Denies recent dental procedure, or trauma to mouth. Yesterday she woke up and the swelling was a lot worse which is why she decided to present for evaluation. Labs at ED is significant for leukocytosis at 11.87. CT neck revealed aggressive cellulitis/myositis at floor of mouth, with an associated necrotic lymph node. ENT consulted to evaluate airway, ruled out frank angina, and rec starting patient on decadron 10 and unasyn. Admit to medicine for abx and airway monitoring.     On evaluation in the ED, the patient is pleasant, in no acute distress. Does endorse some tenderness over anterior neck but otherwise no acute complaints. Denies fever, chills, difficulty swallowing, chest pain, SOB, palpitations, abdominal pain, n/v, diarrhea, constipation, myalgias, arthralgias

## 2024-08-28 NOTE — ED PROVIDER NOTE - ENMT, MLM
Airway patent, Nasal mucosa clear. Mouth with normal mucosa. Throat has no vesicles, no oropharyngeal exudates and uvula is midline. Anterior superior midline neck with mild amount of swelling, nontender. Soft submandibular and submental spaces. No visible dental fracture, gingival swelling or fluctuance.

## 2024-08-28 NOTE — ED PROVIDER NOTE - PROGRESS NOTE DETAILS
CT read noted showing aggressive cellulitis/myositis of the floor the mouth with associated necrotic lymph node.  Patient is without pain in this area, no fever, no stiff neck or drooling or dysphagia noted.  No change in voice or elevation of floor of mouth.  Soft submental space.  At this time exam is not consistent with Camila still patient require IV antibiotics and monitoring for progression.  Given this we will admit to hospital. ED attending Dr. Peter arboleda  - Flaco Watt PA-C ENT consulted, will see patient. - Flaco Watt PA-C CT read noted showing aggressive cellulitis/myositis of the floor the mouth with associated necrotic lymph node.  Patient is without pain in this area, no fever, no stiff neck or drooling or dysphagia noted.  No change in voice or elevation of floor of mouth.  Soft submental space.  At this time exam is not consistent with [place], though will consult ENT for suha and patient will require IV antibiotics and monitoring for potential progression.  Given this we will admit to hospital. ED attending Dr. Peter arboleda  - Flaco Watt PA-C Henry Green MD: Patient signed out pending CT imaging.  CT showed aggressive cellulitis/myositis at the floor of the mouth with an associated necrotic lymph node.  On examination, patient with stable vitals, protecting airway, no stridor or drooling, no tenderness to the submental region, but does have tenderness more inferiorly on external neck examination.  No oropharyngeal swelling, no uvular deviation.  Normal phonation.  ENT consulted at 6:15 PM, so patient, performed scope, see ENT note.  They recommended Unasyn, Decadron. given CT read and rapid progression of sxs in last 24 hours, will admit. Endorsed to hospitalist Dr. Pinto. - DOV BrownC Henry Green MD: Patient signed out pending CT imaging.  CT showed aggressive cellulitis/myositis at the floor of the mouth with an associated necrotic lymph node.  On examination, patient with stable vitals, protecting airway, no stridor or drooling, no tenderness to the submental region, but does have tenderness more inferiorly on external neck examination.  No oropharyngeal swelling, no uvular deviation.  Normal phonation.  ENT consulted at 6:15 PM, so patient, performed scope, see ENT note.  They recommended Unasyn, Decadron. The patient will need to be admitted to the hospital for continued evaluation and management.  Discussed with the accepting physician regarding the initial presentation, diagnostic studies, treatments given in the ED, and current plan of care.   The patient was accepted by and endorsed to the medicine team.

## 2024-08-28 NOTE — H&P ADULT - NSICDXPASTMEDICALHX_GEN_ALL_CORE_FT
PAST MEDICAL HISTORY:  Cafe-au-lait spots     Neurofibroma of thigh 13 cm - right thigh    Neurofibromatosis

## 2024-08-28 NOTE — H&P ADULT - ASSESSMENT
Ms. Douglass is a 32 yo F with PMHx of neurofibromatosis who presented to ED complaining of anterior neck swelling for 2 days with CT showing active anterior neck cellulitis and myositis with associated necrotic lymph node. Admit to medicine for IV antibiotics and airway watch, ENT following.

## 2024-08-28 NOTE — H&P ADULT - NSHPLABSRESULTS_GEN_ALL_CORE
LABS: When present labs, imaging, and ECG were personally reviewed                          13.8   11.87 )-----------( 338      ( 28 Aug 2024 15:32 )             43.0       08-28    134<L>  |  102  |  8   ----------------------------<  83  4.0   |  20<L>  |  0.57    Ca    9.6      28 Aug 2024 15:32    TPro  7.9  /  Alb  4.3  /  TBili  0.5  /  DBili  x   /  AST  16  /  ALT  10  /  AlkPhos  70  08-28       LIVER FUNCTIONS - ( 28 Aug 2024 15:32 )  Alb: 4.3 g/dL / Pro: 7.9 g/dL / ALK PHOS: 70 U/L / ALT: 10 U/L / AST: 16 U/L / GGT: x                    Urinalysis Basic - ( 28 Aug 2024 15:32 )    Color: x / Appearance: x / SG: x / pH: x  Gluc: 83 mg/dL / Ketone: x  / Bili: x / Urobili: x   Blood: x / Protein: x / Nitrite: x   Leuk Esterase: x / RBC: x / WBC x   Sq Epi: x / Non Sq Epi: x / Bacteria: x            Lactate Trend            CAPILLARY BLOOD GLUCOSE                RADIOLOGY & ADDITIONAL TESTS: LABS: When present labs, imaging, and ECG were personally reviewed                          13.8   11.87 )-----------( 338      ( 28 Aug 2024 15:32 )             43.0       08-28    134<L>  |  102  |  8   ----------------------------<  83  4.0   |  20<L>  |  0.57    Ca    9.6      28 Aug 2024 15:32    TPro  7.9  /  Alb  4.3  /  TBili  0.5  /  DBili  x   /  AST  16  /  ALT  10  /  AlkPhos  70  08-28       LIVER FUNCTIONS - ( 28 Aug 2024 15:32 )  Alb: 4.3 g/dL / Pro: 7.9 g/dL / ALK PHOS: 70 U/L / ALT: 10 U/L / AST: 16 U/L / GGT: x                    Urinalysis Basic - ( 28 Aug 2024 15:32 )    Color: x / Appearance: x / SG: x / pH: x  Gluc: 83 mg/dL / Ketone: x  / Bili: x / Urobili: x   Blood: x / Protein: x / Nitrite: x   Leuk Esterase: x / RBC: x / WBC x   Sq Epi: x / Non Sq Epi: x / Bacteria: x          RADIOLOGY & ADDITIONAL TESTS:    < from: CT Neck Soft Tissue w/ IV Cont (08.28.24 @ 17:23) >      IMPRESSION:    1.  Aggressive cellulitis/myositis at the floor of the mouth, with an   associated necrotic lymph node.  2.  Correlate clinically to exclude Alli's angina.  3.  Small incidental neurofibromas.    < end of copied text > no EKG in MUSE or commented upon by ED team no EKG in MUSE, physical ED chart, or commented upon by ED team

## 2024-08-28 NOTE — H&P ADULT - ATTENDING COMMENTS
IN PROGRESS    33F PMH neurofibromatosis p/w neck swelling iso recent throat soreness (resolved) referred by urgent care.     - HR 80s-100s, RR 20-22  - WBC 11.87 (neutrophil predominant)  - rapid strep A neg  - CT Neck Soft Tissue IC:   1.  Aggressive cellulitis/myositis at the floor of the mouth, with an associated necrotic lymph node.  2.  Correlate clinically to exclude Alli's angina.  3.  Small incidental neurofibromas  - s/p unasyn 3g IV, decadron 10mg IV, NS 1L IV     ENT eval in ED, small exudate R tonsil pending Cx, mild ttp anterior neck swelling, b/l neck LAD, fiberoptic flexible laryngoscopy findings unremarkable, wide open airway w/o edema. Recommend check rapid strep, mono; IV unasyn and ID c/s; IV decadron 10mg q8h x 24h; US guided FNA for bx LN; ENT to f/u in AM 33F PMH neurofibromatosis p/w nontender anterior neck swelling x 2 days iso recent tender cervical LAD (now resolved), otherwise denies all complaints. Pleasant on exam, sitting upright conversing with brother in normal voice, tolerating secretions, no stridor, +nontender mass palpated R anterior cervical region +white exudate R tonsil. Otherwise exam unremarkable.     VS: HR 80s-100s, RR 20-22  Labs: WBC 11.87 (neutrophil predominant); rapid strep A neg  CT Neck Soft Tissue IC:   1.  Aggressive cellulitis/myositis at the floor of the mouth, with an associated necrotic lymph node.  2.  Correlate clinically to exclude Alli's angina.  3.  Small incidental neurofibromas  Received unasyn 3g IV, decadron 10mg IV, NS 1L IV     ENT fiberoptic flexible laryngoscopy findings unremarkable, wide open airway w/o edema    #Sepsis 2/2 cellulitis/myositis  SIRS+ (HR, RR)   - c/w Unasyn, decadron IV 10mg q8h x 24h per ENT, f/u ID recs in AM   - f/u rapid mono and Cx  - ENT recs US guided FNA for bx LN, discuss in AM and f/u further input  - VS q4h with      #neurofibromatosis  - continued outpatient f/u with PCP, neuro, derm    #PPx  - f/u screening EKG (not available in MUSE, physical chart, or commented upon by ED)   - SCDs for VTE ppx  - Regular diet with aspiration precautions  - Disposition pending course  - Continued outpatient f/u of abn imaging findings as below     case and plan d/w PGY-2 resident Dr. Romo 33F PMH neurofibromatosis p/w nontender anterior neck swelling x 2 days iso recent tender cervical LAD (now resolved), otherwise denies all complaints. Pleasant on exam, sitting upright conversing with brother in normal voice, tolerating secretions, no stridor, +nontender mass palpated R anterior cervical region +white exudate R tonsil. Otherwise exam unremarkable.     VS: HR 80s-100s, RR 20-22  Labs: WBC 11.87 (neutrophil predominant); rapid strep A neg  CT Neck Soft Tissue IC:   1.  Aggressive cellulitis/myositis at the floor of the mouth, with an associated necrotic lymph node.  2.  Correlate clinically to exclude Alli's angina.  3.  Small incidental neurofibromas  Received unasyn 3g IV, decadron 10mg IV, NS 1L IV     ENT fiberoptic flexible laryngoscopy findings unremarkable, wide open airway w/o edema    #Sepsis 2/2 cellulitis/myositis  SIRS+ (HR, RR)   - c/w Unasyn, decadron IV 10mg q8h x 24h per ENT, f/u ID recs in AM   - f/u rapid mono and Cx  - ENT recs US guided FNA for bx LN, discuss in AM and f/u further input  - VS q4h with      #neurofibromatosis  - continued outpatient f/u with PCP, neuro, derm    #PPx  - f/u screening EKG (not available in MUSE, physical chart, or commented upon by ED)   - encourage ambulation for VTE ppx  - Regular diet with aspiration precautions  - Disposition pending course  - Continued outpatient f/u of abn imaging findings as below     case and plan d/w PGY-2 resident Dr. Romo

## 2024-08-29 DIAGNOSIS — L03.90 CELLULITIS, UNSPECIFIED: ICD-10-CM

## 2024-08-29 DIAGNOSIS — L03.221 CELLULITIS OF NECK: ICD-10-CM

## 2024-08-29 LAB
ALBUMIN SERPL ELPH-MCNC: 4.2 G/DL — SIGNIFICANT CHANGE UP (ref 3.3–5)
ALP SERPL-CCNC: 71 U/L — SIGNIFICANT CHANGE UP (ref 40–120)
ALT FLD-CCNC: 10 U/L — SIGNIFICANT CHANGE UP (ref 10–45)
ANION GAP SERPL CALC-SCNC: 13 MMOL/L — SIGNIFICANT CHANGE UP (ref 5–17)
APTT BLD: 29.5 SEC — SIGNIFICANT CHANGE UP (ref 24.5–35.6)
AST SERPL-CCNC: 17 U/L — SIGNIFICANT CHANGE UP (ref 10–40)
BASOPHILS # BLD AUTO: 0.02 K/UL — SIGNIFICANT CHANGE UP (ref 0–0.2)
BASOPHILS NFR BLD AUTO: 0.2 % — SIGNIFICANT CHANGE UP (ref 0–2)
BILIRUB SERPL-MCNC: 0.3 MG/DL — SIGNIFICANT CHANGE UP (ref 0.2–1.2)
BUN SERPL-MCNC: 8 MG/DL — SIGNIFICANT CHANGE UP (ref 7–23)
CALCIUM SERPL-MCNC: 9.6 MG/DL — SIGNIFICANT CHANGE UP (ref 8.4–10.5)
CHLORIDE SERPL-SCNC: 103 MMOL/L — SIGNIFICANT CHANGE UP (ref 96–108)
CO2 SERPL-SCNC: 20 MMOL/L — LOW (ref 22–31)
CREAT SERPL-MCNC: 0.51 MG/DL — SIGNIFICANT CHANGE UP (ref 0.5–1.3)
EBV EA AB SER IA-ACNC: 40.2 U/ML — HIGH
EBV EA AB TITR SER IF: POSITIVE
EBV EA IGG SER-ACNC: POSITIVE
EBV NA IGG SER IA-ACNC: >600 U/ML — HIGH
EBV PATRN SPEC IB-IMP: SIGNIFICANT CHANGE UP
EBV VCA IGG AVIDITY SER QL IA: POSITIVE
EBV VCA IGM SER IA-ACNC: <10 U/ML — SIGNIFICANT CHANGE UP
EBV VCA IGM SER IA-ACNC: >750 U/ML — HIGH
EBV VCA IGM TITR FLD: NEGATIVE — SIGNIFICANT CHANGE UP
EGFR: 126 ML/MIN/1.73M2 — SIGNIFICANT CHANGE UP
EOSINOPHIL # BLD AUTO: 0 K/UL — SIGNIFICANT CHANGE UP (ref 0–0.5)
EOSINOPHIL NFR BLD AUTO: 0 % — SIGNIFICANT CHANGE UP (ref 0–6)
GLUCOSE SERPL-MCNC: 174 MG/DL — HIGH (ref 70–99)
HCT VFR BLD CALC: 42.1 % — SIGNIFICANT CHANGE UP (ref 34.5–45)
HGB BLD-MCNC: 13.3 G/DL — SIGNIFICANT CHANGE UP (ref 11.5–15.5)
IMM GRANULOCYTES NFR BLD AUTO: 0.6 % — SIGNIFICANT CHANGE UP (ref 0–0.9)
INR BLD: 1.02 RATIO — SIGNIFICANT CHANGE UP (ref 0.85–1.18)
LYMPHOCYTES # BLD AUTO: 0.83 K/UL — LOW (ref 1–3.3)
LYMPHOCYTES # BLD AUTO: 8.5 % — LOW (ref 13–44)
MAGNESIUM SERPL-MCNC: 2 MG/DL — SIGNIFICANT CHANGE UP (ref 1.6–2.6)
MCHC RBC-ENTMCNC: 29 PG — SIGNIFICANT CHANGE UP (ref 27–34)
MCHC RBC-ENTMCNC: 31.6 GM/DL — LOW (ref 32–36)
MCV RBC AUTO: 91.7 FL — SIGNIFICANT CHANGE UP (ref 80–100)
MONOCYTES # BLD AUTO: 0.12 K/UL — SIGNIFICANT CHANGE UP (ref 0–0.9)
MONOCYTES NFR BLD AUTO: 1.2 % — LOW (ref 2–14)
NEUTROPHILS # BLD AUTO: 8.79 K/UL — HIGH (ref 1.8–7.4)
NEUTROPHILS NFR BLD AUTO: 89.5 % — HIGH (ref 43–77)
NRBC # BLD: 0 /100 WBCS — SIGNIFICANT CHANGE UP (ref 0–0)
PHOSPHATE SERPL-MCNC: 1.4 MG/DL — LOW (ref 2.5–4.5)
PLATELET # BLD AUTO: 306 K/UL — SIGNIFICANT CHANGE UP (ref 150–400)
POTASSIUM SERPL-MCNC: 5 MMOL/L — SIGNIFICANT CHANGE UP (ref 3.5–5.3)
POTASSIUM SERPL-SCNC: 5 MMOL/L — SIGNIFICANT CHANGE UP (ref 3.5–5.3)
PROT SERPL-MCNC: 7.6 G/DL — SIGNIFICANT CHANGE UP (ref 6–8.3)
PROTHROM AB SERPL-ACNC: 11.2 SEC — SIGNIFICANT CHANGE UP (ref 9.5–13)
RBC # BLD: 4.59 M/UL — SIGNIFICANT CHANGE UP (ref 3.8–5.2)
RBC # FLD: 12.8 % — SIGNIFICANT CHANGE UP (ref 10.3–14.5)
SODIUM SERPL-SCNC: 136 MMOL/L — SIGNIFICANT CHANGE UP (ref 135–145)
TSH SERPL-MCNC: 1.17 UIU/ML — SIGNIFICANT CHANGE UP (ref 0.27–4.2)
WBC # BLD: 9.82 K/UL — SIGNIFICANT CHANGE UP (ref 3.8–10.5)
WBC # FLD AUTO: 9.82 K/UL — SIGNIFICANT CHANGE UP (ref 3.8–10.5)

## 2024-08-29 PROCEDURE — 88172 CYTP DX EVAL FNA 1ST EA SITE: CPT | Mod: 26

## 2024-08-29 PROCEDURE — 10005 FNA BX W/US GDN 1ST LES: CPT

## 2024-08-29 PROCEDURE — 88305 TISSUE EXAM BY PATHOLOGIST: CPT | Mod: 26

## 2024-08-29 PROCEDURE — 99222 1ST HOSP IP/OBS MODERATE 55: CPT

## 2024-08-29 PROCEDURE — 99233 SBSQ HOSP IP/OBS HIGH 50: CPT

## 2024-08-29 PROCEDURE — 88173 CYTOPATH EVAL FNA REPORT: CPT | Mod: 26

## 2024-08-29 PROCEDURE — 88312 SPECIAL STAINS GROUP 1: CPT | Mod: 26

## 2024-08-29 RX ORDER — SODIUM PHOSPHATE, MONOBASIC, MONOHYDRATE AND SODIUM PHOSPHATE, DIBASIC ANHYDROUS 276; 142 MG/ML; MG/ML
30 INJECTION, SOLUTION INTRAVENOUS ONCE
Refills: 0 | Status: COMPLETED | OUTPATIENT
Start: 2024-08-29 | End: 2024-08-29

## 2024-08-29 RX ORDER — SODIUM PHOSPHATE, DIBASIC, ANHYDROUS, POTASSIUM PHOSPHATE, MONOBASIC, AND SODIUM PHOSPHATE, MONOBASIC, MONOHYDRATE 852; 155; 130 MG/1; MG/1; MG/1
1 TABLET, COATED ORAL ONCE
Refills: 0 | Status: DISCONTINUED | OUTPATIENT
Start: 2024-08-29 | End: 2024-08-29

## 2024-08-29 RX ADMIN — AMPICILLIN SODIUM AND SULBACTAM SODIUM 200 GRAM(S): 1; .5 INJECTION, POWDER, FOR SOLUTION INTRAMUSCULAR; INTRAVENOUS at 00:36

## 2024-08-29 RX ADMIN — Medication 102 MILLIGRAM(S): at 04:09

## 2024-08-29 RX ADMIN — Medication 102 MILLIGRAM(S): at 22:08

## 2024-08-29 RX ADMIN — Medication 102 MILLIGRAM(S): at 15:28

## 2024-08-29 RX ADMIN — AMPICILLIN SODIUM AND SULBACTAM SODIUM 200 GRAM(S): 1; .5 INJECTION, POWDER, FOR SOLUTION INTRAMUSCULAR; INTRAVENOUS at 23:13

## 2024-08-29 RX ADMIN — AMPICILLIN SODIUM AND SULBACTAM SODIUM 200 GRAM(S): 1; .5 INJECTION, POWDER, FOR SOLUTION INTRAMUSCULAR; INTRAVENOUS at 05:35

## 2024-08-29 RX ADMIN — AMPICILLIN SODIUM AND SULBACTAM SODIUM 200 GRAM(S): 1; .5 INJECTION, POWDER, FOR SOLUTION INTRAMUSCULAR; INTRAVENOUS at 13:30

## 2024-08-29 RX ADMIN — SODIUM PHOSPHATE, MONOBASIC, MONOHYDRATE AND SODIUM PHOSPHATE, DIBASIC ANHYDROUS 85 MILLIMOLE(S): 276; 142 INJECTION, SOLUTION INTRAVENOUS at 10:50

## 2024-08-29 RX ADMIN — AMPICILLIN SODIUM AND SULBACTAM SODIUM 200 GRAM(S): 1; .5 INJECTION, POWDER, FOR SOLUTION INTRAMUSCULAR; INTRAVENOUS at 17:36

## 2024-08-29 NOTE — PROGRESS NOTE ADULT - PROBLEM SELECTOR PLAN 3
f/u screening EKG (not present in MUSE or commented upon by ED team)   Diet: Regular   DVT: encourage ambulation  Dispo: Medicine floor pending further ENT eval in morning  Outpt f/u abn imaging findings as below

## 2024-08-29 NOTE — ED ADULT NURSE REASSESSMENT NOTE - NS ED NURSE REASSESS COMMENT FT1
0718 Pt in [facility] 3. TBA. No bed yet. A&Ox4. No c/o. IVL intact without sx of infilt RACF. 0718 Pt in ER red/elsie hallway 3. TBA. No bed yet. A&Ox4. No c/o. IVL intact without sx of infilt RACF. Continuous pulse ox intact with sats %

## 2024-08-29 NOTE — CONSULT NOTE ADULT - SUBJECTIVE AND OBJECTIVE BOX
Interventional Radiology      HPI: 33y Female with neurofibromatosis who presented with anterior neck swelling for 2 days. CT neck revealed aggressive cellulitis/myositis at floor of mouth, with an associated necrotic lymph node. ENT consulted to evaluate airway, ruled out frank angina. IR consulted for biopsy of necrotic lymph node.    Allergies: No Known Allergies    Medications (Abx/Cardiac/Anticoagulation/Blood Products)  ampicillin/sulbactam  IVPB: 200 mL/Hr IV Intermittent (08-28 @ 18:22)  ampicillin/sulbactam  IVPB: 200 mL/Hr IV Intermittent (08-29 @ 05:35)    Home Medications  ferrous sulfate 325 mg (65 mg elemental iron) oral delayed release tablet: 1 tab(s) orally once a day    Data:  167.6  70.3  T(C): 36.8  HR: 85  BP: 148/74  RR: 18  SpO2: 98%    -WBC 9.82 / HgB 13.3 / Hct 42.1 / Plt 306  -Na 136 / Cl 103 / BUN 8 / Glucose 174  -K 5.0 / CO2 20 / Cr 0.51  -ALT 10 / Alk Phos 71 / T.Bili 0.3    Radiology: reviewed    Assessment/Plan:   33y Female with neurofibromatosis who presented with anterior neck swelling for 2 days. CT neck revealed aggressive cellulitis/myositis at floor of mouth, with an associated necrotic lymph node. ENT consulted to evaluate airway, ruled out frank angina. IR consulted for biopsy of necrotic lymph node.    Case reviewed with Dr. Pinto. Given that the lymph node is small and necrotic, it would likely have low yield for biopsy. Consider consulting DR ultrasound for ultrasound guided biopsy if still clinically indicated. No indication for IR intervention at this time. Please re-consult IR as needed.      Aleena Mendoza MD   Interventional Radiology     Contact on [employer] Teams for nonemergent issues    - Nonemergent consults:  [facility] order "Consult- Interventional Radiology", no page required  - Emergent issues (pager): [facility] [phone]; [facility] [phone]; [phone]; DO NOT PAGE FOR SCHEDULING QUESTIONS  - Scheduling questions 8am-6pm : [facility] [phone]; [facility] [phone],   - Clinic/outpatient booking: [facility] [phone]; [facility] [phone]  Interventional Radiology      HPI: 33y Female with neurofibromatosis who presented with anterior neck swelling for 2 days. CT neck revealed aggressive cellulitis/myositis at floor of mouth, with an associated necrotic lymph node. ENT consulted to evaluate airway, ruled out frank angina. IR consulted for biopsy of necrotic lymph node.    Allergies: No Known Allergies    Medications (Abx/Cardiac/Anticoagulation/Blood Products)  ampicillin/sulbactam  IVPB: 200 mL/Hr IV Intermittent (08-28 @ 18:22)  ampicillin/sulbactam  IVPB: 200 mL/Hr IV Intermittent (08-29 @ 05:35)    Home Medications  ferrous sulfate 325 mg (65 mg elemental iron) oral delayed release tablet: 1 tab(s) orally once a day    Data:  167.6  70.3  T(C): 36.8  HR: 85  BP: 148/74  RR: 18  SpO2: 98%    -WBC 9.82 / HgB 13.3 / Hct 42.1 / Plt 306  -Na 136 / Cl 103 / BUN 8 / Glucose 174  -K 5.0 / CO2 20 / Cr 0.51  -ALT 10 / Alk Phos 71 / T.Bili 0.3    Radiology: reviewed    Assessment/Plan:   33y Female with neurofibromatosis who presented with anterior neck swelling for 2 days. CT neck revealed aggressive cellulitis/myositis at floor of mouth, with an associated necrotic lymph node. ENT consulted to evaluate airway, ruled out frank angina. IR consulted for biopsy of necrotic lymph node.    Case reviewed with Dr. Pinto. Given that the lymph node is small and necrotic, it would likely have low yield for tissue biopsy. Consider consulting DR ultrasound for ultrasound guided aspiration for culture if still required for speciation. No indication for IR intervention at this time. Please re-consult IR as needed.      Aleena Mendoza MD   Interventional Radiology     Contact on Microsoft Teams for nonemergent issues    - Nonemergent consults:  [facility] order "Consult- Interventional Radiology", no page required  - Emergent issues (pager): [facility] [phone]; [facility] [phone]; [phone]; DO NOT PAGE FOR SCHEDULING QUESTIONS  - Scheduling questions 8am-6pm : [facility] [phone]; [facility] [phone],   - Clinic/outpatient booking: [facility] [phone]; [facility] [phone]         Attestation Statement:   I fully participated in the care of this patient.  I have made amendments to the documentation where necessary, and agree with the history and plan as documented by the Student/Resident/Fellow/ACP, with changes as below:

## 2024-08-29 NOTE — PATIENT PROFILE ADULT - FALL HARM RISK - HARM RISK INTERVENTIONS
Communicate Risk of Fall with Harm to all staff/Monitor for mental status changes/Monitor gait and stability/Reinforce activity limits and safety measures with patient and family/Tailored Fall Risk Interventions/Visual Cue: Yellow wristband and red socks/Bed in lowest position, wheels locked, appropriate side rails in place/Call bell, personal items and telephone in reach/Instruct patient to call for assistance before getting out of bed or chair/Non-slip footwear when patient is out of bed/[place] to call system/Physically safe environment - no spills, clutter or unnecessary equipment/Purposeful Proactive Rounding/Room/bathroom lighting operational, light cord in reach

## 2024-08-29 NOTE — PROGRESS NOTE ADULT - SUBJECTIVE AND OBJECTIVE BOX
ENT ISSUE/POD: anterior neck swelling     HPI: 33 year old, Female, with PMHx of neurofibromatosis, presented to ED complaning of anterior neck swelling for 2 days. Labs at ED is significant for leucocytosis at 11.87. CT neck revealed aggressive cellulitis/myositis at floor of mouth, with an associated necrotic lymph node. Correlate clinically to exclude Alli's angina. ENT consulted to evaluate airway and to rule out Alli/s angina. Per patient, she has mild sore throat earlier last week, but has since resolved. Endores mild tenderness when she palpated her anterior neck. Otherwise, no difficulty tolerating diet, tolerating oral secretion. Also denies difficulty swallowing, shortness of breath, or voice change. Denies recent dental procedure, or trauma to mouth. Currently, pt denies F/C/N/V, rhinorrhea, odynophagia, dysphonia, changes in voice.           PAST MEDICAL & SURGICAL HISTORY:  Neurofibroma of thigh  13 cm - right thigh      Neurofibromatosis      Cafe-au-lait spots      Neurofibroma        Allergies    No Known Allergies    Intolerances      MEDICATIONS  (STANDING):  ampicillin/sulbactam  IVPB 3 Gram(s) IV Intermittent every 6 hours  dexAMETHasone  IVPB 10 milliGRAM(s) IV Intermittent every 8 hours  ferrous    sulfate 325 milliGRAM(s) Oral <User Schedule>    MEDICATIONS  (PRN):  melatonin 3 milliGRAM(s) Oral at bedtime PRN Insomnia      Social History: see consult    Family history: see consult    ROS:   ENT: all negative except as noted in HPI   Pulm: denies SOB, cough, hemoptysis  Neuro: denies numbness/tingling, loss of sensation  Endo: denies heat/cold intolerance, excessive sweating      Vital Signs Last 24 Hrs  T(C): 37.1 (29 Aug 2024 04:15), Max: 37.1 (28 Aug 2024 13:08)  T(F): 98.7 (29 Aug 2024 04:15), Max: 98.7 (28 Aug 2024 13:08)  HR: 85 (29 Aug 2024 04:15) (73 - 106)  BP: 119/81 (29 Aug 2024 04:15) (108/74 - 119/81)  BP(mean): 94 (29 Aug 2024 04:15) (94 - 94)  RR: 17 (29 Aug 2024 04:15) (16 - 22)  SpO2: 98% (29 Aug 2024 04:15) (97% - 100%)    Parameters below as of 29 Aug 2024 04:15  Patient On (Oxygen Delivery Method): room air                              13.3   9.82  )-----------( 306      ( 29 Aug 2024 06:08 )             42.1    08-29    136  |  103  |  8   ----------------------------<  174<H>  5.0   |  20<L>  |  0.51    Ca    9.6      29 Aug 2024 06:08  Phos  1.4     08-29  Mg     2.0     08-29    TPro  7.6  /  Alb  4.2  /  TBili  0.3  /  DBili  x   /  AST  17  /  ALT  10  /  AlkPhos  71  08-29       PHYSICAL EXAM:  Gen: NAD  Skin: No rashes, bruises, or lesions  Head: Normocephalic, Atraumatic  Face: no edema, erythema, or fluctuance. Parotid glands soft without mass  Eyes: no scleral injection  Nose: Nares bilaterally patent, no discharge  Mouth: floor of mouth soft. no tongue swelling or tongue elevation. a small amount of white exudate noted on right tonsil. No Stridor / Drooling / Trismus.  Mucosa moist, tongue/uvula midline, oropharynx clear  Neck: anterior neck swelling with mild TTP.   Lymphatic: b/l neck lymphadenopathy  Resp: breathing easily, no stridor  CV: no peripheral edema/cyanosis  GI: nondistended   Peripheral vascular: no JVD or edema  Neuro: facial nerve intact, no facial droop           ENT ISSUE/POD: anterior neck swelling     HPI: 33 year old, Female, with PMHx of neurofibromatosis, presented to ED complaining of anterior neck swelling for 2 days. Labs at ED is significant for leucocytosis at 11.87. CT neck revealed aggressive cellulitis/myositis at floor of mouth, with an associated necrotic lymph node. Correlate clinically to exclude Alli's angina. ENT consulted to evaluate airway and to rule out Alli/s angina. Per patient, she has mild sore throat earlier last week, but has since resolved. Endorses mild tenderness when she palpated her anterior neck. Otherwise, no difficulty tolerating diet, tolerating oral secretion. Neck CT revealed aggressive cellulitis/myositis at FOM with associated necrotic lymph node. Also denies difficulty swallowing, shortness of breath, or voice change. Denies recent dental procedure, or trauma to mouth. Currently, pt denies F/C/N/V, rhinorrhea, odynophagia, dysphonia, changes in voice.           PAST MEDICAL & SURGICAL HISTORY:  Neurofibroma of thigh  13 cm - right thigh      Neurofibromatosis      Cafe-au-lait spots      Neurofibroma        Allergies    No Known Allergies    Intolerances      MEDICATIONS  (STANDING):  ampicillin/sulbactam  IVPB 3 Gram(s) IV Intermittent every 6 hours  dexAMETHasone  IVPB 10 milliGRAM(s) IV Intermittent every 8 hours  ferrous    sulfate 325 milliGRAM(s) Oral <User Schedule>    MEDICATIONS  (PRN):  melatonin 3 milliGRAM(s) Oral at bedtime PRN Insomnia      Social History: see consult    Family history: see consult    ROS:   ENT: all negative except as noted in HPI   Pulm: denies SOB, cough, hemoptysis  Neuro: denies numbness/tingling, loss of sensation  Endo: denies heat/cold intolerance, excessive sweating      Vital Signs Last 24 Hrs  T(C): 37.1 (29 Aug 2024 04:15), Max: 37.1 (28 Aug 2024 13:08)  T(F): 98.7 (29 Aug 2024 04:15), Max: 98.7 (28 Aug 2024 13:08)  HR: 85 (29 Aug 2024 04:15) (73 - 106)  BP: 119/81 (29 Aug 2024 04:15) (108/74 - 119/81)  BP(mean): 94 (29 Aug 2024 04:15) (94 - 94)  RR: 17 (29 Aug 2024 04:15) (16 - 22)  SpO2: 98% (29 Aug 2024 04:15) (97% - 100%)    Parameters below as of 29 Aug 2024 04:15  Patient On (Oxygen Delivery Method): room air                              13.3   9.82  )-----------( 306      ( 29 Aug 2024 06:08 )             42.1    08-29    136  |  103  |  8   ----------------------------<  174<H>  5.0   |  20<L>  |  0.51    Ca    9.6      29 Aug 2024 06:08  Phos  1.4     08-29  Mg     2.0     08-29    TPro  7.6  /  Alb  4.2  /  TBili  0.3  /  DBili  x   /  AST  17  /  ALT  10  /  AlkPhos  71  08-29       PHYSICAL EXAM:  Gen: NAD  Skin: No rashes, bruises, or lesions  Head: Normocephalic, Atraumatic  Face: no edema, erythema, or fluctuance. Parotid glands soft without mass  Eyes: no scleral injection  Nose: Nares bilaterally patent, no discharge  Mouth: floor of mouth soft. no tongue swelling or tongue elevation. a small amount of white exudate noted on right tonsil. No Stridor / Drooling / Trismus.  Mucosa moist, tongue/uvula midline, oropharynx clear  Neck: anterior neck swelling with mild TTP.   Lymphatic: b/l neck lymphadenopathy  Resp: breathing easily, no stridor  CV: no peripheral edema/cyanosis  GI: nondistended   Peripheral vascular: no JVD or edema  Neuro: facial nerve intact, no facial droop

## 2024-08-29 NOTE — PROGRESS NOTE ADULT - PROBLEM SELECTOR PLAN 1
Patient presents with anterior neck swelling, skin changes, and warmth, labs with mild leukocytosis   - CT showing cellulitis and myositis at floor of the mouth with associated necrotic lymph node  - ENT following, appreciate recs   - Protecting airway currently   - Cont decadron 10 q8 for 24 hours  - Cont IV unasyn  - f/u infectious workup including strep culture, rapid strep, mono  - ID consult in morning per ENT  - US guided FNA biopsy of necrotic lymph node, d/w ENT in AM  - F/u ENT recs in morning Patient presents with anterior neck swelling, skin changes, and warmth, labs with mild leukocytosis. CT w/ cellulitis/myositis + necrotic LN. Protecting airway, pt ENT scope bedside, no interior involvement.     Plan:   - ENT following, appreciate recs   - C/s ID, appreciate recs   - Cont decadron 10 q8 for 24 hours  - Cont IV unasyn  - Urine strep/legionella, pending   - US guided FNA biopsy of necrotic lymph node, d/w ENT in AM

## 2024-08-29 NOTE — PROGRESS NOTE ADULT - ATTENDING COMMENTS
32 yo F with PMHx of neurofibromatosis who presented to ED complaining of anterior neck swelling for 2 days with CT showing active anterior neck cellulitis and myositis with associated necrotic lymph node, now on IV antibiotics, s/p fna biopsy and culture. F/u ID and ENT recs

## 2024-08-29 NOTE — ED ADULT NURSE REASSESSMENT NOTE - NS ED NURSE REASSESS COMMENT FT1
Report received from Abeba VASQUEZ. Pt AXOX4 breathing unlabored on room air and speaking in complete sentences. Pt returned from UR, VSS. Pt updated on plan of care; awaiting USr. PT endorsing neck discomfort from rash. Provider Juan Jose contacted via teams for meds. Safety and comfort measures maintained. Bed in lowest position. Bed rails in appropriate positions.

## 2024-08-29 NOTE — CONSULT NOTE ADULT - ASSESSMENT
33-year-old female with a past medical history of NF1 who was admitted to the hospital due to anterior neck swelling.      #Abnormal imaging of the neck  #Anterior neck cellulitis with associated necrotic lymph node status post biopsy on 8/29/2024  #Leukocytosis, resolved  Unclear etiology for swelling however will treat as cellulitis for now  ENT following–improving today on antibiotics  No history of scratches, no cats at home  Covering for oral infection, oral anarobe etiologies  Less likely Kikuchi given symptoms however this is a diagnosis of exclusion    Recommendations  Continue ampicillin/sulbactam for now  Follow pending culture obtained from biopsy procedure  Follow pathology report  ENT input  Follow fever curve and WBC count    Juan Singh MD  Division of Infectious Diseases  
 33 year old, Female, with PMHx of neurofibromatosis, presented to ED complaning of anterior neck swelling for 2 days. Labs at ED is significant for leucocysotis at 11.87. CT neck revealed aggressive cellulitis/myositis at floor of mouth, with an associated necrotic lymph node. Correlate clinically to exclude Alli's angina. ENT consulted to evaluate airway and to rule out Alli/s angina. On exam, floor of mouth is soft. Noted a small exudate on right tonsillar. pending culture. No tongue swelling or elevation. No retention of oral secretions. Anterior neck swelling has mild tenderness on palpation, with b/l neck lymphadenopathy. Fiberoptic flexible laryngoscopy findings are unremarkable. wide open airway without any edema.

## 2024-08-29 NOTE — ED ADULT NURSE REASSESSMENT NOTE - NS ED NURSE REASSESS COMMENT FT1
Report received from PEDRO Lynch . Pt AAOx4, NAD, resp nonlabored, skin warm/dry, resting comfortably in bed with family at bedside. Pt denies headache, dizziness, chest pain, palpitations, SOB, abd pain, n/v/d, urinary symptoms, fevers, chills, weakness at this time. Pt is admitted and awaiting bed . Safety maintained.

## 2024-08-29 NOTE — PROGRESS NOTE ADULT - PROBLEM SELECTOR PLAN 1
- Rec admission to medicine  - please check  rapid strep and mono   - rec IV unasyn for now, and ID consulted for further evaluation   - rec IV decadron 10mg Q8H x 24 hours.   - rec US guided FNA for biopsy of lymph node.   - ENT will follow up tomorrow morning  - please page or call [phone] if concerns or issue. - Rec admission to medicine  - rec IV unasyn for now, and ID consulted for further evaluation   - rec IV decadron 10mg Q8H x 24 hours.   - rec US guided FNA for biopsy of lymph node.   - please page or call [phone] if concerns or issue.

## 2024-08-29 NOTE — PROGRESS NOTE ADULT - SUBJECTIVE AND OBJECTIVE BOX
DATE OF SERVICE: 08-29-24 @ 07:53    Patient is a 33y old  Female who presents with a chief complaint of Cellulitis/myositis (28 Aug 2024 21:13)      SUBJECTIVE / OVERNIGHT EVENTS:  Overnight,  Pt seen and examined at bedside.    ROS negative except as above.    MEDICATIONS  (STANDING):  ampicillin/sulbactam  IVPB 3 Gram(s) IV Intermittent every 6 hours  dexAMETHasone  IVPB 10 milliGRAM(s) IV Intermittent every 8 hours  ferrous    sulfate 325 milliGRAM(s) Oral <User Schedule>    MEDICATIONS  (PRN):  melatonin 3 milliGRAM(s) Oral at bedtime PRN Insomnia      Vital Signs Last 24 Hrs  T(C): 37.1 (29 Aug 2024 04:15), Max: 37.1 (28 Aug 2024 13:08)  T(F): 98.7 (29 Aug 2024 04:15), Max: 98.7 (28 Aug 2024 13:08)  HR: 85 (29 Aug 2024 04:15) (73 - 106)  BP: 119/81 (29 Aug 2024 04:15) (108/74 - 119/81)  BP(mean): 94 (29 Aug 2024 04:15) (94 - 94)  RR: 17 (29 Aug 2024 04:15) (16 - 22)  SpO2: 98% (29 Aug 2024 04:15) (97% - 100%)    Parameters below as of 29 Aug 2024 04:15  Patient On (Oxygen Delivery Method): room air      CAPILLARY BLOOD GLUCOSE        I&O's Summary      PHYSICAL EXAM:  GENERAL: NAD, well-developed  HEAD:  Atraumatic, Normocephalic  EYES: EOMI, conjunctiva and sclera clear  NECK: Supple, No JVD  CHEST/LUNG: Clear to auscultation bilaterally; No wheeze  HEART: Regular rate and rhythm; No murmurs, rubs, or gallops  ABDOMEN: Soft, Nontender, Nondistended; Bowel sounds present  EXTREMITIES:  2+ Peripheral Pulses, No clubbing, cyanosis, or edema  NEUROLOGY: AOx3, non-focal  SKIN: No rashes or lesions    LABS:                        13.3   9.82  )-----------( 306      ( 29 Aug 2024 06:08 )             42.1     08-29    136  |  103  |  8   ----------------------------<  174<H>  5.0   |  20<L>  |  0.51    Ca    9.6      29 Aug 2024 06:08  Phos  1.4     08-29  Mg     2.0     08-29    TPro  7.6  /  Alb  4.2  /  TBili  0.3  /  DBili  x   /  AST  17  /  ALT  10  /  AlkPhos  71  08-29            MICRO:      RADIOLOGY & ADDITIONAL TESTS:           DATE OF SERVICE: 08-29-24 @ 07:53    Patient is a 33y old  Female who presents with a chief complaint of Cellulitis/myositis (28 Aug 2024 21:13)      SUBJECTIVE / OVERNIGHT EVENTS:  Overnight, ANALILIA.   Pt seen and examined at bedside in the ED. Pt is feeling well. Denies pain unless palpated. Denies SOB, trouble breathing.     ROS negative except as above.    MEDICATIONS  (STANDING):  ampicillin/sulbactam  IVPB 3 Gram(s) IV Intermittent every 6 hours  dexAMETHasone  IVPB 10 milliGRAM(s) IV Intermittent every 8 hours  ferrous    sulfate 325 milliGRAM(s) Oral <User Schedule>    MEDICATIONS  (PRN):  melatonin 3 milliGRAM(s) Oral at bedtime PRN Insomnia      Vital Signs Last 24 Hrs  T(C): 37.1 (29 Aug 2024 04:15), Max: 37.1 (28 Aug 2024 13:08)  T(F): 98.7 (29 Aug 2024 04:15), Max: 98.7 (28 Aug 2024 13:08)  HR: 85 (29 Aug 2024 04:15) (73 - 106)  BP: 119/81 (29 Aug 2024 04:15) (108/74 - 119/81)  BP(mean): 94 (29 Aug 2024 04:15) (94 - 94)  RR: 17 (29 Aug 2024 04:15) (16 - 22)  SpO2: 98% (29 Aug 2024 04:15) (97% - 100%)    Parameters below as of 29 Aug 2024 04:15  Patient On (Oxygen Delivery Method): room air      CAPILLARY BLOOD GLUCOSE        I&O's Summary      PHYSICAL EXAM:  GENERAL: NAD, well-developed, well-nousished   HEAD:  Atraumatic, Normocephalic  EYES: EOMI, conjunctiva and sclera clear  NECK: Supple, No JVD, submandibular central swelling with surrounding erythema, inferior neck to upper chest erythema and dryness   CHEST/LUNG: Clear to auscultation bilaterally; No wheeze  HEART: Regular rate and rhythm; No murmurs, rubs, or gallops  ABDOMEN: Soft, Nontender, Nondistended; Bowel sounds present  EXTREMITIES:  2+ Peripheral Pulses, No clubbing, cyanosis, or edema  NEUROLOGY: AOx4, non-focal  SKIN: No rashes or lesions    LABS:                        13.3   9.82  )-----------( 306      ( 29 Aug 2024 06:08 )             42.1     08-29    136  |  103  |  8   ----------------------------<  174<H>  5.0   |  20<L>  |  0.51    Ca    9.6      29 Aug 2024 06:08  Phos  1.4     08-29  Mg     2.0     08-29    TPro  7.6  /  Alb  4.2  /  TBili  0.3  /  DBili  x   /  AST  17  /  ALT  10  /  AlkPhos  71  08-29            MICRO:      RADIOLOGY & ADDITIONAL TESTS:

## 2024-08-29 NOTE — PROGRESS NOTE ADULT - ASSESSMENT
33 year old, Female, with PMHx of neurofibromatosis, presented to ED complaning of anterior neck swelling for 2 days. Labs at ED is significant for leucocysotis at 11.87. CT neck revealed aggressive cellulitis/myositis at floor of mouth, with an associated necrotic lymph node. Correlate clinically to exclude Alli's angina. ENT consulted to evaluate airway and to rule out Alli/s angina. On exam, floor of mouth is soft. Noted a small exudate on right tonsillar. pending culture. No tongue swelling or elevation. No retention of oral secretions. Anterior neck swelling has mild tenderness on palpation, with b/l neck lymphadenopathy. Fiberoptic flexible laryngoscopy findings are unremarkable. wide open airway without any edema.     33 year old, Female, with PMHx of neurofibromatosis, presented to ED complaning of anterior neck swelling for 2 days. Labs at ED is significant for leucocysotis at 11.87. CT neck revealed aggressive cellulitis/myositis at floor of mouth, with an associated necrotic lymph node. Correlate clinically to exclude Alli's angina. ENT consulted to evaluate airway and to rule out Alli/s angina. On exam, floor of mouth is soft. Noted a small exudate on right tonsillar. pending culture. No tongue swelling or elevation. No retention of oral secretions. Anterior neck swelling has mild tenderness on palpation, with b/l neck lymphadenopathy. Fiberoptic flexible laryngoscopy findings are unremarkable. wide open airway without any edema. Rapid strep negative. Neck CT revealed aggressive cellulitis/myositis at FOM with associated necrotic lymph node. US guided FNA pending

## 2024-08-29 NOTE — CONSULT NOTE ADULT - SUBJECTIVE AND OBJECTIVE BOX
Patient is a 33y old  Female who presents with a chief complaint of Cellulitis/myositis (29 Aug 2024 08:33)    HPI:  33-year-old female with a past medical history of NF1 who was admitted to the hospital due to anterior neck swelling.    Patient reports in her usual state of health up until 2 days prior to admission when she noted worsening swelling of the anterior neck and mandible.  Patient reported some tenderness however did not seek medical care at the time.  Patient reports a sore throat about a week prior to admission however this has resolved. Patient denied any other systemic or localizing symptoms at this time.  Patient denied any difficulty swallowing, shortness of breath.  Patient with last dental work in May 2024.  No trauma to the neck, no cuts, no lacerations.  Patient with no significant travel, went on a cruise in [place] over the summer.  No pets at home, lives with mother–no sick contacts.  Works in an office setting.  No other animal/insect exposure.  Patient does report change to skin care routine in recent weeks.    Patient reports on the day prior to presentation woke up with worsening swelling and decided to present to urgent care for further evaluation.  Patient was referred to the ED due to concern for airway compromise in setting of worsening neck swelling.    In the ED, patient is afebrile and otherwise hemodynamically stable.  Initial labs show leukocytosis 11.8, BMP with renal function within normal limits, EBV testing shows positive abdomen, IgG group A strep negative, CT neck showed cellulitis/myositis in the floor of the mouth with a necrotic lymph node.  ENT consulted on admission, patient undergoing FNA of necrotic lymph node on 8/29/2024.  Patient was started on ampicillin/sulbactam and Decadron.      prior hospital charts reviewed [  ]  primary team notes reviewed [  ]  other consultant notes reviewed [  ]    PAST MEDICAL & SURGICAL HISTORY:  Neurofibroma of thigh  13 cm - right thigh      Neurofibromatosis      Cafe-au-lait spots      Neurofibroma          Allergies  No Known Allergies    ANTIMICROBIALS (past 90 days)  MEDICATIONS  (STANDING):  ampicillin/sulbactam  IVPB   200 mL/Hr IV Intermittent (08-28-24 @ 18:22)    ampicillin/sulbactam  IVPB   200 mL/Hr IV Intermittent (08-29-24 @ 17:36)   200 mL/Hr IV Intermittent (08-29-24 @ 13:30)   200 mL/Hr IV Intermittent (08-29-24 @ 05:35)   200 mL/Hr IV Intermittent (08-29-24 @ 00:36)        ampicillin/sulbactam  IVPB 3 every 6 hours    MEDICATIONS  (STANDING):  dexAMETHasone  IVPB 10 every 8 hours  melatonin 3 at bedtime PRN    SOCIAL HISTORY:       FAMILY HISTORY:  Family history unknown      REVIEW OF SYSTEMS  [  ] ROS unobtainable because:    [  ] All other systems negative except as noted below:	    Constitutional:  [ ] fever [ ] chills  [ ] weight loss  [ ] weakness  Skin:  [ ] rash [ ] phlebitis	  Eyes: [ ] icterus [ ] pain  [ ] discharge	  ENMT: [ ] sore throat  [ ] thrush [ ] ulcers [ ] exudates  Respiratory: [ ] dyspnea [ ] hemoptysis [ ] cough [ ] sputum	  Cardiovascular:  [ ] chest pain [ ] palpitations [ ] edema	  Gastrointestinal:  [ ] nausea [ ] vomiting [ ] diarrhea [ ] constipation [ ] pain	  Genitourinary:  [ ] dysuria [ ] frequency [ ] hematuria [ ] discharge [ ] flank pain  [ ] incontinence  Musculoskeletal:  [ ] myalgias [ ] arthralgias [ ] arthritis  [ ] back pain  Neurological:  [ ] headache [ ] seizures  [ ] confusion/altered mental status  Psychiatric:  [ ] anxiety [ ] depression	  Hematology/Lymphatics:  [ ] lymphadenopathy  Endocrine:  [ ] adrenal [ ] thyroid  Allergic/Immunologic:	 [ ] transplant [ ] seasonal    Vital Signs Last 24 Hrs  T(F): 98.7 (08-29-24 @ 16:11), Max: 99 (08-29-24 @ 15:50)  Vital Signs Last 24 Hrs  HR: 83 (08-29-24 @ 16:11) (73 - 97)  BP: 116/74 (08-29-24 @ 16:11) (102/64 - 148/74)  RR: 18 (08-29-24 @ 16:11)  SpO2: 99% (08-29-24 @ 16:11) (98% - 100%)  Wt(kg): --    PHYSICAL EXAM:  Constitutional: non-toxic, no distress  HEAD/EYES: anicteric, no conjunctival injection  ENT:  supple, no thrush  Cardiovascular:   normal S1, S2, no murmur, no edema  Respiratory:  clear BS bilaterally, no wheezes, no rales  GI:  soft, non-tender, normal bowel sounds  :  no sullivan, no CVA tenderness  Musculoskeletal:  no synovitis, normal ROM  Neurologic: awake and alert, normal strength, no focal findings  Skin:  no rash, no erythema, no phlebitis  Heme/Onc: no lymphadenopathy   Psychiatric:  awake, alert, appropriate mood                            13.3   9.82  )-----------( 306      ( 29 Aug 2024 06:08 )             42.1   08-29    136  |  103  |  8   ----------------------------<  174<H>  5.0   |  20<L>  |  0.51    Ca    9.6      29 Aug 2024 06:08  Phos  1.4     08-29  Mg     2.0     08-29    TPro  7.6  /  Alb  4.2  /  TBili  0.3  /  DBili  x   /  AST  17  /  ALT  10  /  AlkPhos  71  08-29    Urinalysis Basic - ( 29 Aug 2024 06:08 )    Color: x / Appearance: x / SG: x / pH: x  Gluc: 174 mg/dL / Ketone: x  / Bili: x / Urobili: x   Blood: x / Protein: x / Nitrite: x   Leuk Esterase: x / RBC: x / WBC x   Sq Epi: x / Non Sq Epi: x / Bacteria: x    MICROBIOLOGY:              RADIOLOGY:  imaging below personally reviewed and agree with findings

## 2024-08-30 ENCOUNTER — TRANSCRIPTION ENCOUNTER (OUTPATIENT)
Age: 34
End: 2024-08-30

## 2024-08-30 VITALS
DIASTOLIC BLOOD PRESSURE: 65 MMHG | OXYGEN SATURATION: 99 % | RESPIRATION RATE: 18 BRPM | SYSTOLIC BLOOD PRESSURE: 100 MMHG | TEMPERATURE: 99 F | HEART RATE: 66 BPM

## 2024-08-30 LAB
CULTURE RESULTS: SIGNIFICANT CHANGE UP
GRAM STN FLD: ABNORMAL
GRAM STN FLD: SIGNIFICANT CHANGE UP
HCT VFR BLD CALC: 40 % — SIGNIFICANT CHANGE UP (ref 34.5–45)
HGB BLD-MCNC: 13.1 G/DL — SIGNIFICANT CHANGE UP (ref 11.5–15.5)
MCHC RBC-ENTMCNC: 29.3 PG — SIGNIFICANT CHANGE UP (ref 27–34)
MCHC RBC-ENTMCNC: 32.8 GM/DL — SIGNIFICANT CHANGE UP (ref 32–36)
MCV RBC AUTO: 89.5 FL — SIGNIFICANT CHANGE UP (ref 80–100)
NRBC # BLD: 0 /100 WBCS — SIGNIFICANT CHANGE UP (ref 0–0)
PHOSPHATE SERPL-MCNC: 3.7 MG/DL — SIGNIFICANT CHANGE UP (ref 2.5–4.5)
PLATELET # BLD AUTO: 356 K/UL — SIGNIFICANT CHANGE UP (ref 150–400)
RBC # BLD: 4.47 M/UL — SIGNIFICANT CHANGE UP (ref 3.8–5.2)
RBC # FLD: 12.9 % — SIGNIFICANT CHANGE UP (ref 10.3–14.5)
SPECIMEN SOURCE: SIGNIFICANT CHANGE UP
SPECIMEN SOURCE: SIGNIFICANT CHANGE UP
WBC # BLD: 18.08 K/UL — HIGH (ref 3.8–10.5)
WBC # FLD AUTO: 18.08 K/UL — HIGH (ref 3.8–10.5)

## 2024-08-30 PROCEDURE — 85730 THROMBOPLASTIN TIME PARTIAL: CPT

## 2024-08-30 PROCEDURE — 87081 CULTURE SCREEN ONLY: CPT

## 2024-08-30 PROCEDURE — 88173 CYTOPATH EVAL FNA REPORT: CPT

## 2024-08-30 PROCEDURE — 87075 CULTR BACTERIA EXCEPT BLOOD: CPT

## 2024-08-30 PROCEDURE — 70491 CT SOFT TISSUE NECK W/DYE: CPT | Mod: MC

## 2024-08-30 PROCEDURE — 83735 ASSAY OF MAGNESIUM: CPT

## 2024-08-30 PROCEDURE — 88185 FLOWCYTOMETRY/TC ADD-ON: CPT

## 2024-08-30 PROCEDURE — 85025 COMPLETE CBC W/AUTO DIFF WBC: CPT

## 2024-08-30 PROCEDURE — 87186 SC STD MICRODIL/AGAR DIL: CPT

## 2024-08-30 PROCEDURE — 86664 EPSTEIN-BARR NUCLEAR ANTIGEN: CPT

## 2024-08-30 PROCEDURE — 88184 FLOWCYTOMETRY/ TC 1 MARKER: CPT

## 2024-08-30 PROCEDURE — 84443 ASSAY THYROID STIM HORMONE: CPT

## 2024-08-30 PROCEDURE — 87205 SMEAR GRAM STAIN: CPT

## 2024-08-30 PROCEDURE — 88305 TISSUE EXAM BY PATHOLOGIST: CPT

## 2024-08-30 PROCEDURE — 99231 SBSQ HOSP IP/OBS SF/LOW 25: CPT

## 2024-08-30 PROCEDURE — 84100 ASSAY OF PHOSPHORUS: CPT

## 2024-08-30 PROCEDURE — 10005 FNA BX W/US GDN 1ST LES: CPT

## 2024-08-30 PROCEDURE — 85610 PROTHROMBIN TIME: CPT

## 2024-08-30 PROCEDURE — 86663 EPSTEIN-BARR ANTIBODY: CPT

## 2024-08-30 PROCEDURE — 96361 HYDRATE IV INFUSION ADD-ON: CPT

## 2024-08-30 PROCEDURE — 99285 EMERGENCY DEPT VISIT HI MDM: CPT

## 2024-08-30 PROCEDURE — 88312 SPECIAL STAINS GROUP 1: CPT

## 2024-08-30 PROCEDURE — 99239 HOSP IP/OBS DSCHRG MGMT >30: CPT

## 2024-08-30 PROCEDURE — 85027 COMPLETE CBC AUTOMATED: CPT

## 2024-08-30 PROCEDURE — 80053 COMPREHEN METABOLIC PANEL: CPT

## 2024-08-30 PROCEDURE — 86665 EPSTEIN-BARR CAPSID VCA: CPT

## 2024-08-30 PROCEDURE — 87070 CULTURE OTHR SPECIMN AEROBIC: CPT

## 2024-08-30 PROCEDURE — 96374 THER/PROPH/DIAG INJ IV PUSH: CPT

## 2024-08-30 PROCEDURE — 88172 CYTP DX EVAL FNA 1ST EA SITE: CPT

## 2024-08-30 PROCEDURE — 84702 CHORIONIC GONADOTROPIN TEST: CPT

## 2024-08-30 PROCEDURE — 87880 STREP A ASSAY W/OPTIC: CPT

## 2024-08-30 RX ORDER — AMPICILLIN SODIUM AND SULBACTAM SODIUM 1; .5 G/1; G/1
0 INJECTION, POWDER, FOR SOLUTION INTRAMUSCULAR; INTRAVENOUS
Refills: 0
Start: 2024-08-30

## 2024-08-30 RX ORDER — AMOXICILLIN AND CLAVULANATE POTASSIUM 250; 125 MG/1; MG/1
1 TABLET, FILM COATED ORAL
Qty: 16 | Refills: 0
Start: 2024-08-30 | End: 2024-09-06

## 2024-08-30 RX ORDER — AMOXICILLIN AND CLAVULANATE POTASSIUM 250; 125 MG/1; MG/1
1 TABLET, FILM COATED ORAL
Qty: 18 | Refills: 0
Start: 2024-08-30 | End: 2024-09-07

## 2024-08-30 RX ADMIN — AMPICILLIN SODIUM AND SULBACTAM SODIUM 200 GRAM(S): 1; .5 INJECTION, POWDER, FOR SOLUTION INTRAMUSCULAR; INTRAVENOUS at 11:19

## 2024-08-30 RX ADMIN — AMPICILLIN SODIUM AND SULBACTAM SODIUM 200 GRAM(S): 1; .5 INJECTION, POWDER, FOR SOLUTION INTRAMUSCULAR; INTRAVENOUS at 05:21

## 2024-08-30 RX ADMIN — Medication 325 MILLIGRAM(S): at 11:20

## 2024-08-30 NOTE — DISCHARGE NOTE PROVIDER - NSDCCPTREATMENT_GEN_ALL_CORE_FT
PRINCIPAL PROCEDURE  Procedure: CT soft tissue neck  Findings and Treatment: FINDINGS:  There is soft tissue stranding at the floor of the mouth, with thickening   of the muscular sling. The appearance is suspicious for   cellulitis/myositis. There is no subcutaneous gas or an organized fluid   collection at this time. There are reactive lymph nodes adjacent to the   lesion. There is a poorly marginated necrotic node beneath the hyoid bone   measuring 1.0 x 1.3 x 2.0 cm. The appearance is somewhat aggressive, and   clinical correlation will be required to exclude Alli's angina.  There is straightening of the normal cervical lordosis, which could be   congenital or related to muscle spasm. There is mild reversal centered at   C7. There are small incidental subcutaneous lesions in the left dorsal   neck and left temporalis fossa, consistent with the known diagnosis of   neurofibromatosis.  The oral cavity, dentition, sublingual, submandibular, nasopharyngeal,   pharyngeal, , and retropharyngeal spaces are otherwise   negative. The parotids, submandibular, and thyroid glands are otherwise   negative. The parapharyngeal fat, carotid spaces, posterior triangles,   paraspinal musculature, epiglottis, and larynx appear otherwise   unremarkable. Limited views of the brain parenchyma, skull base, skull   base foramina, and visualized upper chest are otherwise negative. No   perineural spread of disease. No drainable fluid collections or an   abscess.  IMPRESSION:  1.  Aggressive cellulitis/myositis at the floor of the mouth, with an   associated necrotic lymph node.  2.  Correlate clinically to exclude Alli's angina.  3.  Small incidental neurofibromas.

## 2024-08-30 NOTE — PROGRESS NOTE ADULT - SUBJECTIVE AND OBJECTIVE BOX
Follow Up:  neck swelling/cellulitis    Interval History/ROS:  Overnight: No acute events.  Patient remains afebrile.  Otherwise hemodynamically stable on room air.  Latest labs show leukocytosis of 18.  A strep negative on throat culture, no growth on tissue culture obtained from neck.    Patient seen examined at bedside.  Overall doing well.  Denies any new pain or discomfort.    Allergies  No Known Allergies    ANTIMICROBIALS:  ampicillin/sulbactam  IVPB 3 every 6 hours      OTHER MEDS:  MEDICATIONS  (STANDING):  melatonin 3 at bedtime PRN      Vital Signs Last 24 Hrs  T(C): 37.2 (30 Aug 2024 13:12), Max: 37.2 (30 Aug 2024 13:12)  T(F): 98.9 (30 Aug 2024 13:12), Max: 98.9 (30 Aug 2024 13:12)  HR: 66 (30 Aug 2024 13:12) (65 - 93)  BP: 100/65 (30 Aug 2024 13:12) (100/65 - 119/85)  BP(mean): --  RR: 18 (30 Aug 2024 13:12) (18 - 18)  SpO2: 99% (30 Aug 2024 13:12) (97% - 99%)    Parameters below as of 30 Aug 2024 13:12  Patient On (Oxygen Delivery Method): room air        PHYSICAL EXAM:  Constitutional: non-toxic, no distress  HEAD/EYES: anicteric, no conjunctival injection  ENT:  supple, no thrush  Cardiovascular:   normal S1, S2, no murmur, no edema  Respiratory:  clear BS bilaterally, no wheezes, no rales  GI:  soft, non-tender, normal bowel sounds  :  no sullivan, no CVA tenderness  Musculoskeletal:  no synovitis, normal ROM  Neurologic: awake and alert, normal strength, no focal findings  Skin:  Neck erythema down to upper chest improving - midmandibular swelling  Heme/Onc: no lymphadenopathy   Psychiatric:  awake, alert, appropriate mood                            13.1   18.08 )-----------( 356      ( 30 Aug 2024 06:54 )             40.0       08-29    136  |  103  |  8   ----------------------------<  174<H>  5.0   |  20<L>  |  0.51    Ca    9.6      29 Aug 2024 06:08  Phos  3.7     08-30  Mg     2.0     08-29    TPro  7.6  /  Alb  4.2  /  TBili  0.3  /  DBili  x   /  AST  17  /  ALT  10  /  AlkPhos  71  08-29      Urinalysis Basic - ( 29 Aug 2024 06:08 )    Color: x / Appearance: x / SG: x / pH: x  Gluc: 174 mg/dL / Ketone: x  / Bili: x / Urobili: x   Blood: x / Protein: x / Nitrite: x   Leuk Esterase: x / RBC: x / WBC x   Sq Epi: x / Non Sq Epi: x / Bacteria: x        MICROBIOLOGY:  v    Culture - Tissue with Gram Stain (collected 29 Aug 2024 15:02)  Source: Tissue Other  Gram Stain (30 Aug 2024 05:48):    No polymorphonuclear leukocytes seen per low power field    No organisms seen per oil power field    Culture - Group A Streptococcus (collected 28 Aug 2024 21:25)  Source: Throat  Final Report (30 Aug 2024 08:15):    No Streptococcus pyogenes (Group A) isolated                    RADIOLOGY:  Imaging reviewed

## 2024-08-30 NOTE — PROGRESS NOTE ADULT - PROBLEM SELECTOR PLAN 3
f/u screening EKG (not present in MUSE or commented upon by ED team)   Diet: Regular   DVT: encourage ambulation  Dispo: Medicine floor pending further ENT eval in morning  Outpt f/u abn imaging findings as below Diet: Regular   DVT: encourage ambulation  Dispo: Medicine floor pending further ENT eval in morning  Outpt f/u abn imaging findings as below

## 2024-08-30 NOTE — DISCHARGE NOTE PROVIDER - ATTENDING DISCHARGE PHYSICAL EXAMINATION:
PHYSICAL EXAM:  GENERAL: NAD, well-developed  HEAD:  Atraumatic, Normocephalic  EYES: EOMI, conjunctiva and sclera clear  NECK: Supple, No JVD, slight swelling under chin w/ associated erythema   CHEST/LUNG: Clear to auscultation bilaterally; No wheeze  HEART: Regular rate and rhythm; No murmurs, rubs, or gallops  ABDOMEN: Soft, Nontender, Nondistended; Bowel sounds present  EXTREMITIES:  2+ Peripheral Pulses, No clubbing, cyanosis, or edema  NEUROLOGY: AOx3, non-focal  SKIN: No rashes or lesions

## 2024-08-30 NOTE — DISCHARGE NOTE PROVIDER - CARE PROVIDERS DIRECT ADDRESSES
,DirectAddress_Unknown ,DirectAddress_Unknown,[email or web address]

## 2024-08-30 NOTE — DISCHARGE NOTE PROVIDER - CARE PROVIDER_API CALL
Clayton Boo  Otolaryngology  [address] [place] [address]  Phone: [phone]  Fax: [phone]  Follow Up Time: 1 week   Clayton Boo  Otolaryngology  [address] [place] [address]  Phone: [phone]  Fax: [phone]  Follow Up Time: 1 week    Breana Haider  Family Medicine  [address] [place] [address]  Phone: [phone]  Fax: [phone]  Follow Up Time: 2 weeks    Juan Singh  Infectious Disease  [address] [place] [address]  Phone: [phone]  Fax: [phone]  Follow Up Time: 1 week    Jai Delaney  Infectious Disease  [address] [place] [address]  Phone: [phone]  Fax: [phone]  Follow Up Time: 2 weeks    Fer Jamison  Internal Medicine  [address] [facility]  [place] [address]  Phone: [phone]  Fax: [phone]  Follow Up Time: 2 weeks    Jacquie Gordon  Internal Medicine  [address] [facility], [address]  [place] [address]  Phone: [phone]  Fax: [phone]  Follow Up Time: 2 weeks

## 2024-08-30 NOTE — DISCHARGE NOTE NURSING/CASE MANAGEMENT/SOCIAL WORK - PATIENT PORTAL LINK FT
You can access the FollowMyHealth Patient Portal offered by [facility] by registering at the following website: [email or web address]. By joining [facility]’s FollowMyHealth portal, you will also be able to view your health information using other applications (apps) compatible with our system.

## 2024-08-30 NOTE — PROGRESS NOTE ADULT - SUBJECTIVE AND OBJECTIVE BOX
DATE OF SERVICE: 08-30-24 @ 07:27    Patient is a 33y old  Female who presents with a chief complaint of Cellulitis/myositis (29 Aug 2024 17:41)      SUBJECTIVE / OVERNIGHT EVENTS:  Overnight,  Pt seen and examined at bedside.    ROS negative except as above.    MEDICATIONS  (STANDING):  ampicillin/sulbactam  IVPB 3 Gram(s) IV Intermittent every 6 hours  ferrous    sulfate 325 milliGRAM(s) Oral <User Schedule>    MEDICATIONS  (PRN):  melatonin 3 milliGRAM(s) Oral at bedtime PRN Insomnia      Vital Signs Last 24 Hrs  T(C): 36.7 (30 Aug 2024 05:12), Max: 37.2 (29 Aug 2024 15:50)  T(F): 98 (30 Aug 2024 05:12), Max: 99 (29 Aug 2024 15:50)  HR: 79 (30 Aug 2024 05:12) (65 - 97)  BP: 117/74 (30 Aug 2024 05:12) (102/64 - 122/72)  BP(mean): 89 (29 Aug 2024 15:50) (89 - 89)  RR: 18 (30 Aug 2024 05:12) (16 - 18)  SpO2: 97% (30 Aug 2024 05:12) (97% - 100%)    Parameters below as of 30 Aug 2024 05:12  Patient On (Oxygen Delivery Method): room air      CAPILLARY BLOOD GLUCOSE        I&O's Summary    29 Aug 2024 07:01  -  30 Aug 2024 07:00  --------------------------------------------------------  IN: 500 mL / OUT: 0 mL / NET: 500 mL        PHYSICAL EXAM:  GENERAL: NAD, well-developed  HEAD:  Atraumatic, Normocephalic  EYES: EOMI, conjunctiva and sclera clear  NECK: Supple, No JVD  CHEST/LUNG: Clear to auscultation bilaterally; No wheeze  HEART: Regular rate and rhythm; No murmurs, rubs, or gallops  ABDOMEN: Soft, Nontender, Nondistended; Bowel sounds present  EXTREMITIES:  2+ Peripheral Pulses, No clubbing, cyanosis, or edema  NEUROLOGY: AOx3, non-focal  SKIN: No rashes or lesions    LABS:                        13.1   18.08 )-----------( 356      ( 30 Aug 2024 06:54 )             40.0     08-29    136  |  103  |  8   ----------------------------<  174<H>  5.0   |  20<L>  |  0.51    Ca    9.6      29 Aug 2024 06:08  Phos  1.4     08-29  Mg     2.0     08-29    TPro  7.6  /  Alb  4.2  /  TBili  0.3  /  DBili  x   /  AST  17  /  ALT  10  /  AlkPhos  71  08-29    PT/INR - ( 29 Aug 2024 12:09 )   PT: 11.2 sec;   INR: 1.02 ratio         PTT - ( 29 Aug 2024 12:09 )  PTT:29.5 sec        MICRO:    Culture - Tissue with Gram Stain (collected 08-29-24 @ 15:02)  Source: Tissue Other  Gram Stain (08-30-24 @ 05:48):    No polymorphonuclear leukocytes seen per low power field    No organisms seen per oil power field        RADIOLOGY & ADDITIONAL TESTS:           DATE OF SERVICE: 08-30-24 @ 07:27    Patient is a 33y old  Female who presents with a chief complaint of Cellulitis/myositis (29 Aug 2024 17:41)      SUBJECTIVE / OVERNIGHT EVENTS:  Overnight, no acute events.   Pt seen and examined at bedside.    ROS negative except as above.    MEDICATIONS  (STANDING):  ampicillin/sulbactam  IVPB 3 Gram(s) IV Intermittent every 6 hours  ferrous    sulfate 325 milliGRAM(s) Oral <User Schedule>    MEDICATIONS  (PRN):  melatonin 3 milliGRAM(s) Oral at bedtime PRN Insomnia      Vital Signs Last 24 Hrs  T(C): 36.7 (30 Aug 2024 05:12), Max: 37.2 (29 Aug 2024 15:50)  T(F): 98 (30 Aug 2024 05:12), Max: 99 (29 Aug 2024 15:50)  HR: 79 (30 Aug 2024 05:12) (65 - 97)  BP: 117/74 (30 Aug 2024 05:12) (102/64 - 122/72)  BP(mean): 89 (29 Aug 2024 15:50) (89 - 89)  RR: 18 (30 Aug 2024 05:12) (16 - 18)  SpO2: 97% (30 Aug 2024 05:12) (97% - 100%)    Parameters below as of 30 Aug 2024 05:12  Patient On (Oxygen Delivery Method): room air      CAPILLARY BLOOD GLUCOSE        I&O's Summary    29 Aug 2024 07:01  -  30 Aug 2024 07:00  --------------------------------------------------------  IN: 500 mL / OUT: 0 mL / NET: 500 mL        PHYSICAL EXAM:  GENERAL: NAD, well-developed  HEAD:  Atraumatic, Normocephalic  EYES: EOMI, conjunctiva and sclera clear  NECK: Supple, No JVD, slight swelling under chin w/ associated erythema   CHEST/LUNG: Clear to auscultation bilaterally; No wheeze  HEART: Regular rate and rhythm; No murmurs, rubs, or gallops  ABDOMEN: Soft, Nontender, Nondistended; Bowel sounds present  EXTREMITIES:  2+ Peripheral Pulses, No clubbing, cyanosis, or edema  NEUROLOGY: AOx3, non-focal  SKIN: No rashes or lesions    LABS:                        13.1   18.08 )-----------( 356      ( 30 Aug 2024 06:54 )             40.0     08-29    136  |  103  |  8   ----------------------------<  174<H>  5.0   |  20<L>  |  0.51    Ca    9.6      29 Aug 2024 06:08  Phos  1.4     08-29  Mg     2.0     08-29    TPro  7.6  /  Alb  4.2  /  TBili  0.3  /  DBili  x   /  AST  17  /  ALT  10  /  AlkPhos  71  08-29    PT/INR - ( 29 Aug 2024 12:09 )   PT: 11.2 sec;   INR: 1.02 ratio         PTT - ( 29 Aug 2024 12:09 )  PTT:29.5 sec        MICRO:    Culture - Tissue with Gram Stain (collected 08-29-24 @ 15:02)  Source: Tissue Other  Gram Stain (08-30-24 @ 05:48):    No polymorphonuclear leukocytes seen per low power field    No organisms seen per oil power field        RADIOLOGY & ADDITIONAL TESTS:

## 2024-08-30 NOTE — PROGRESS NOTE ADULT - ASSESSMENT
33-year-old female with a past medical history of NF1 who was admitted to the hospital due to anterior neck swelling.      #Abnormal imaging of the neck  #Anterior neck cellulitis with associated necrotic lymph node status post biopsy on 8/29/2024  #Leukocytosis, resolved  Unclear etiology for swelling however will treat as cellulitis for now  ENT following–improving today on antibiotics  No history of scratches, no cats at home  Covering for oral infection, oral anaerobe etiologies  Less likely Kikuchi given symptoms however this is a diagnosis of exclusion    Recommendations  Continue ampicillin/sulbactam for now  Follow pending culture obtained from biopsy procedure, negative to date  Follow pathology report  Steroid management per ENT  If otherwise ready for discharge, switch to amox/clav 875 BID and complete 10 days total of therapy  Follow fever curve and WBC count - Leuocytosis in setting of steroid use most likely  Advised for close outpatient follow-up with PCP, ENT when discharged    ID to sign off. Please contact as issues arise.    Juan Singh MD  Division of Infectious Diseases

## 2024-08-30 NOTE — PROGRESS NOTE ADULT - ASSESSMENT
Ms. Douglass is a 34 yo F with PMHx of neurofibromatosis who presented to ED complaining of anterior neck swelling for 2 days with CT showing active anterior neck cellulitis and myositis with associated necrotic lymph node. Admit to medicine for IV antibiotics and airway watch, ENT following.

## 2024-08-30 NOTE — DISCHARGE NOTE PROVIDER - NSDCFUSCHEDAPPT_GEN_ALL_CORE_FT
Clayton Boo  [facility]  OTOLARYNG [address] [facility]  Scheduled Appointment: 09/09/2024

## 2024-08-30 NOTE — DISCHARGE NOTE PROVIDER - NSDCMRMEDTOKEN_GEN_ALL_CORE_FT
ferrous sulfate 325 mg (65 mg elemental iron) oral delayed release tablet: 1 tab(s) orally once a day   amoxicillin-clavulanate 875 mg-125 mg oral tablet: 1 tab(s) orally 2 times a day Please take until 9/7.  ferrous sulfate 325 mg (65 mg elemental iron) oral delayed release tablet: 1 tab(s) orally once a day

## 2024-08-30 NOTE — DISCHARGE NOTE PROVIDER - NSDCHHCONTACT_GEN_ALL_CORE_FT
Birth Control Pills Counseling: Birth Control Pill Counseling: I discussed with the patient the potential side effects of OCPs including but not limited to increased risk of stroke, heart attack, thrombophlebitis, deep venous thrombosis, hepatic adenomas, breast changes, GI upset, headaches, and depression.  The patient verbalized understanding of the proper use and possible adverse effects of OCPs. All of the patient's questions and concerns were addressed. Erythromycin Pregnancy And Lactation Text: This medication is Pregnancy Category B and is considered safe during pregnancy. It is also excreted in breast milk. Azelaic Acid Pregnancy And Lactation Text: This medication is considered safe during pregnancy and breast feeding. Topical Clindamycin Counseling: Patient counseled that this medication may cause skin irritation or allergic reactions.  In the event of skin irritation, the patient was advised to reduce the amount of the drug applied or use it less frequently.   The patient verbalized understanding of the proper use and possible adverse effects of clindamycin.  All of the patient's questions and concerns were addressed. Sarecycline Counseling: Patient advised regarding possible photosensitivity and discoloration of the teeth, skin, lips, tongue and gums.  Patient instructed to avoid sunlight, if possible.  When exposed to sunlight, patients should wear protective clothing, sunglasses, and sunscreen.  The patient was instructed to call the office immediately if the following severe adverse effects occur:  hearing changes, easy bruising/bleeding, severe headache, or vision changes.  The patient verbalized understanding of the proper use and possible adverse effects of sarecycline.  All of the patient's questions and concerns were addressed. Aklief Pregnancy And Lactation Text: It is unknown if this medication is safe to use during pregnancy.  It is unknown if this medication is excreted in breast milk.  Breastfeeding women should use the topical cream on the smallest area of the skin for the shortest time needed while breastfeeding.  Do not apply to nipple and areola. Spironolactone Counseling: Patient advised regarding risks of diarrhea, abdominal pain, hyperkalemia, birth defects (for female patients), liver toxicity and renal toxicity. The patient may need blood work to monitor liver and kidney function and potassium levels while on therapy. The patient verbalized understanding of the proper use and possible adverse effects of spironolactone.  All of the patient's questions and concerns were addressed. Isotretinoin Pregnancy And Lactation Text: This medication is Pregnancy Category X and is considered extremely dangerous during pregnancy. It is unknown if it is excreted in breast milk. Topical Sulfur Applications Counseling: Topical Sulfur Counseling: Patient counseled that this medication may cause skin irritation or allergic reactions.  In the event of skin irritation, the patient was advised to reduce the amount of the drug applied or use it less frequently.   The patient verbalized understanding of the proper use and possible adverse effects of topical sulfur application.  All of the patient's questions and concerns were addressed. Azithromycin Counseling:  I discussed with the patient the risks of azithromycin including but not limited to GI upset, allergic reaction, drug rash, diarrhea, and yeast infections. Topical Retinoid counseling:  Patient advised to apply a pea-sized amount only at bedtime and wait 30 minutes after washing their face before applying.  If too drying, patient may add a non-comedogenic moisturizer. The patient verbalized understanding of the proper use and possible adverse effects of retinoids.  All of the patient's questions and concerns were addressed. Minocycline Counseling: Patient advised regarding possible photosensitivity and discoloration of the teeth, skin, lips, tongue and gums.  Patient instructed to avoid sunlight, if possible.  When exposed to sunlight, patients should wear protective clothing, sunglasses, and sunscreen.  The patient was instructed to call the office immediately if the following severe adverse effects occur:  hearing changes, easy bruising/bleeding, severe headache, or vision changes.  The patient verbalized understanding of the proper use and possible adverse effects of minocycline.  All of the patient's questions and concerns were addressed. Tetracycline Pregnancy And Lactation Text: This medication is Pregnancy Category D and not consider safe during pregnancy. It is also excreted in breast milk. High Dose Vitamin A Counseling: Side effects reviewed, pt to contact office should one occur. Topical Sulfur Applications Pregnancy And Lactation Text: This medication is Pregnancy Category C and has an unknown safety profile during pregnancy. It is unknown if this topical medication is excreted in breast milk. Detail Level: Detailed Dapsone Pregnancy And Lactation Text: This medication is Pregnancy Category C and is not considered safe during pregnancy or breast feeding. Winlevi Pregnancy And Lactation Text: This medication is considered safe during pregnancy and breastfeeding. Bactrim Counseling:  I discussed with the patient the risks of sulfa antibiotics including but not limited to GI upset, allergic reaction, drug rash, diarrhea, dizziness, photosensitivity, and yeast infections.  Rarely, more serious reactions can occur including but not limited to aplastic anemia, agranulocytosis, methemoglobinemia, blood dyscrasias, liver or kidney failure, lung infiltrates or desquamative/blistering drug rashes. Doxycycline Pregnancy And Lactation Text: This medication is Pregnancy Category D and not consider safe during pregnancy. It is also excreted in breast milk but is considered safe for shorter treatment courses. Use Enhanced Medication Counseling?: No Tazorac Counseling:  Patient advised that medication is irritating and drying.  Patient may need to apply sparingly and wash off after an hour before eventually leaving it on overnight.  The patient verbalized understanding of the proper use and possible adverse effects of tazorac.  All of the patient's questions and concerns were addressed. Isotretinoin Counseling: Patient should get monthly blood tests, not donate blood, not drive at night if vision affected, not share medication, and not undergo elective surgery for 6 months after tx completed. Side effects reviewed, pt to contact office should one occur. Tazorac Pregnancy And Lactation Text: This medication is not safe during pregnancy. It is unknown if this medication is excreted in breast milk. Azelaic Acid Counseling: Patient counseled that medicine may cause skin irritation and to avoid applying near the eyes.  In the event of skin irritation, the patient was advised to reduce the amount of the drug applied or use it less frequently.   The patient verbalized understanding of the proper use and possible adverse effects of azelaic acid.  All of the patient's questions and concerns were addressed. Bactrim Pregnancy And Lactation Text: This medication is Pregnancy Category D and is known to cause fetal risk.  It is also excreted in breast milk. Erythromycin Counseling:  I discussed with the patient the risks of erythromycin including but not limited to GI upset, allergic reaction, drug rash, diarrhea, increase in liver enzymes, and yeast infections. Topical Clindamycin Pregnancy And Lactation Text: This medication is Pregnancy Category B and is considered safe during pregnancy. It is unknown if it is excreted in breast milk. Benzoyl Peroxide Counseling: Patient counseled that medicine may cause skin irritation and bleach clothing.  In the event of skin irritation, the patient was advised to reduce the amount of the drug applied or use it less frequently.   The patient verbalized understanding of the proper use and possible adverse effects of benzoyl peroxide.  All of the patient's questions and concerns were addressed. Birth Control Pills Pregnancy And Lactation Text: This medication should be avoided if pregnant and for the first 30 days post-partum. Spironolactone Pregnancy And Lactation Text: This medication can cause feminization of the male fetus and should be avoided during pregnancy. The active metabolite is also found in breast milk. Doxycycline Counseling:  Patient counseled regarding possible photosensitivity and increased risk for sunburn.  Patient instructed to avoid sunlight, if possible.  When exposed to sunlight, patients should wear protective clothing, sunglasses, and sunscreen.  The patient was instructed to call the office immediately if the following severe adverse effects occur:  hearing changes, easy bruising/bleeding, severe headache, or vision changes.  The patient verbalized understanding of the proper use and possible adverse effects of doxycycline.  All of the patient's questions and concerns were addressed. High Dose Vitamin A Pregnancy And Lactation Text: High dose vitamin A therapy is contraindicated during pregnancy and breast feeding. Topical Retinoid Pregnancy And Lactation Text: This medication is Pregnancy Category C. It is unknown if this medication is excreted in breast milk. Winlevi Counseling:  I discussed with the patient the risks of topical clascoterone including but not limited to erythema, scaling, itching, and stinging. Patient voiced their understanding. Benzoyl Peroxide Pregnancy And Lactation Text: This medication is Pregnancy Category C. It is unknown if benzoyl peroxide is excreted in breast milk. Dapsone Counseling: I discussed with the patient the risks of dapsone including but not limited to hemolytic anemia, agranulocytosis, rashes, methemoglobinemia, kidney failure, peripheral neuropathy, headaches, GI upset, and liver toxicity.  Patients who start dapsone require monitoring including baseline LFTs and weekly CBCs for the first month, then every month thereafter.  The patient verbalized understanding of the proper use and possible adverse effects of dapsone.  All of the patient's questions and concerns were addressed. Tetracycline Counseling: Patient counseled regarding possible photosensitivity and increased risk for sunburn.  Patient instructed to avoid sunlight, if possible.  When exposed to sunlight, patients should wear protective clothing, sunglasses, and sunscreen.  The patient was instructed to call the office immediately if the following severe adverse effects occur:  hearing changes, easy bruising/bleeding, severe headache, or vision changes.  The patient verbalized understanding of the proper use and possible adverse effects of tetracycline.  All of the patient's questions and concerns were addressed. Patient understands to avoid pregnancy while on therapy due to potential birth defects. Patient Specific Counseling (Will Not Stick From Patient To Patient): Pt states Doxycycline 50 mg was prescribed for treatment but made no improvement \\nAdjustments made for treatment. \\nPt states on hormone therapy currently \\nPt sees Brunilda HILLIARD Aklief counseling:  Patient advised to apply a pea-sized amount only at bedtime and wait 30 minutes after washing their face before applying.  If too drying, patient may add a non-comedogenic moisturizer.  The most commonly reported side effects including irritation, redness, scaling, dryness, stinging, burning, itching, and increased risk of sunburn.  The patient verbalized understanding of the proper use and possible adverse effects of retinoids.  All of the patient's questions and concerns were addressed. As certified below, I, or a nurse practitioner or physician assistant working with me, had a face-to-face encounter that meets the physician face-to-face encounter requirements. Azithromycin Pregnancy And Lactation Text: This medication is considered safe during pregnancy and is also secreted in breast milk.

## 2024-08-30 NOTE — DISCHARGE NOTE NURSING/CASE MANAGEMENT/SOCIAL WORK - NSDCFUADDAPPT_GEN_ALL_CORE_FT
APPTS ARE READY TO BE MADE: [X] YES    Best Family or Patient Contact (if needed):    Additional Information about above appointments (if needed):    1: Dr. Clayton Boo   2:   3:     Other comments or requests:

## 2024-08-30 NOTE — PROGRESS NOTE ADULT - PROBLEM SELECTOR PLAN 2
- Several nodules over the body noted on exam  - Has had larger nodule over right hip, other areas surgically removed in past  - continued outpatient f/u
- Several nodules over the body noted on exam  - Has had larger nodule over right hip, other areas surgically removed in past  - continued outpatient f/u

## 2024-08-30 NOTE — DISCHARGE NOTE PROVIDER - NSDCFUADDAPPT_GEN_ALL_CORE_FT
APPTS ARE READY TO BE MADE: [X] YES    Best Family or Patient Contact (if needed):    Additional Information about above appointments (if needed):    1: Dr. Clayton Boo   2:   3:     Other comments or requests:    APPTS ARE READY TO BE MADE: [X] YES    Best Family or Patient Contact (if needed):    Additional Information about above appointments (if needed):    1: Dr. Clayton Boo   2:   3:     Other comments or requests:   [facility]- Appointment was scheduled by our team on the patient's behalf through the provider's office. Appt is on 9/20/24 at 8:40am with  [address] [place] [phone]    [facility]-Appointment was scheduled by our team on the patient's behalf through the provider's office. Appt is on 10/10/24 at 10:00am  [address] [place] [phone]    [facility]-Prior to outreaching the patient, it was visible that the patient has secured a follow up appointment which was not scheduled by our team. Appt is on 9/9/24 at 11:00am with  [address] [place] [id number]

## 2024-08-30 NOTE — PROGRESS NOTE ADULT - NSPROGADDITIONALINFOA_GEN_ALL_CORE
Preliminary note. Recommendations not finalized until attending attestation     Yadira Hoffmann MD  PGY 1
Preliminary note. Recommendations not finalized until attending attestation     Yadira Hoffmann MD  PGY 1

## 2024-08-30 NOTE — PROGRESS NOTE ADULT - PROBLEM SELECTOR PLAN 1
Patient presents with anterior neck swelling, skin changes, and warmth, labs with mild leukocytosis. CT w/ cellulitis/myositis + necrotic LN. Protecting airway, pt ENT scope bedside, no interior involvement.     Plan:   - ENT following, appreciate recs   - C/s ID, appreciate recs   - Cont decadron 10 q8 for 24 hours  - Cont IV unasyn  - Urine strep/legionella, pending   - US guided FNA biopsy of necrotic lymph node, d/w ENT in AM Patient presents with anterior neck swelling, skin changes, and warmth, labs with mild leukocytosis. CT w/ cellulitis/myositis + necrotic LN. Protecting airway, pt ENT scope bedside, no interior involvement.     S/p US guided FNA of necrotic LN     Plan:   - ENT following, appreciate recs   - C/s ID, appreciate recs   - Cont IV unasyn  - F/u o/p for bx results with ENT

## 2024-08-30 NOTE — DISCHARGE NOTE PROVIDER - HOSPITAL COURSE
Ms. Douglass is a 34 yo F with neurofibromatosis who presented to ED complaining of anterior neck swelling for 2 days. Patient was in her normal state of health until about two days ago when she noticed swelling of her anterior neck and under jaw. She had mild tenderness but did not think much of it. Se has mild sore throat earlier last week, but has since resolved. Endores mild tenderness when she palpated her anterior neck. Otherwise, no difficulty tolerating diet, tolerating oral secretion. Also denies difficulty swallowing, shortness of breath, or voice change. Denies recent dental procedure, or trauma to mouth. Yesterday she woke up and the swelling was a lot worse which is why she decided to present for evaluation. Labs at ED is significant for leukocytosis at 11.87. CT neck revealed aggressive cellulitis/myositis at floor of mouth, with an associated necrotic lymph node. ENT consulted to evaluate airway, ruled out alli angina, and rec starting patient on decadron 10 and unasyn. Admit to medicine for abx and airway monitoring.     On evaluation in the ED, the patient is pleasant, in no acute distress. Does endorse some tenderness over anterior neck but otherwise no acute complaints. Denies fever, chills, difficulty swallowing, chest pain, SOB, palpitations, abdominal pain, n/v, diarrhea, constipation, myalgias, arthralgias.    Hospital Course:     Pt was admitted to medicine for close airway monitoring and concern for Alli's angina. ENT was consulted and an endoscope was completed, showing no internal airway involvement or inflammation. Pt was started on IV Unasyn and IV dexamethasone 10 q8 for 24 hours for treatment of cellulitis/myositis and reduction of inflammation. US-guided FNA was completed of submental necrotic lymph node for further evaluation. Gram stain of FNA aspiration was negative, tissue path, flow cytometry and other work up is pending. ID recommended continuation of IV Unsayn Ms. Douglass is a 32 yo F with neurofibromatosis who presented to ED complaining of anterior neck swelling for 2 days. Patient was in her normal state of health until about two days ago when she noticed swelling of her anterior neck and under jaw. She had mild tenderness but did not think much of it. Se has mild sore throat earlier last week, but has since resolved. Endores mild tenderness when she palpated her anterior neck. Otherwise, no difficulty tolerating diet, tolerating oral secretion. Also denies difficulty swallowing, shortness of breath, or voice change. Denies recent dental procedure, or trauma to mouth. Yesterday she woke up and the swelling was a lot worse which is why she decided to present for evaluation. Labs at ED is significant for leukocytosis at 11.87. CT neck revealed aggressive cellulitis/myositis at floor of mouth, with an associated necrotic lymph node. ENT consulted to evaluate airway, ruled out alli angina, and rec starting patient on decadron 10 and unasyn. Admit to medicine for abx and airway monitoring.     On evaluation in the ED, the patient is pleasant, in no acute distress. Does endorse some tenderness over anterior neck but otherwise no acute complaints. Denies fever, chills, difficulty swallowing, chest pain, SOB, palpitations, abdominal pain, n/v, diarrhea, constipation, myalgias, arthralgias.    Hospital Course:     Pt was admitted to medicine for close airway monitoring and concern for Alli's angina. ENT was consulted and an endoscope was completed, showing no internal airway involvement or inflammation. Pt was started on IV Unasyn and IV dexamethasone 10 q8 for 24 hours for treatment of cellulitis/myositis and reduction of inflammation. US-guided FNA was completed of submental necrotic lymph node for further evaluation. Gram stain of FNA aspiration was negative, tissue path, flow cytometry and other work up is pending. ID recommended continuation of IV Unasyn and amoxicillin/clavulanate 825 mg BID for 10 days after hospitalization. Pt reports remarkable improvement in swelling with the initiation and continuation of the steroid and antibiotics. On the morning of discharge, pt was feeling well, with improvement in swelling and erythema. Pt tolerated PO diet well and had no SOB/difficulty breathing. Pt appropriate for discharge with ENT follow-up for biopsy results and 10 day course of antibiotics.     Medication changes:   - Start 825 Augmentin BID for 10 days     Follow-up appointments:  - Please follow-up with ENT Dr. Boo for bx results and further evaluation     Code status:   FULL CODE Ms. Douglass is a 32 yo F with neurofibromatosis who presented to ED complaining of anterior neck swelling for 2 days. Patient was in her normal state of health until about two days ago when she noticed swelling of her anterior neck and under jaw. She had mild tenderness but did not think much of it. Se has mild sore throat earlier last week, but has since resolved. Endores mild tenderness when she palpated her anterior neck. Otherwise, no difficulty tolerating diet, tolerating oral secretion. Also denies difficulty swallowing, shortness of breath, or voice change. Denies recent dental procedure, or trauma to mouth. Yesterday she woke up and the swelling was a lot worse which is why she decided to present for evaluation. Labs at ED is significant for leukocytosis at 11.87. CT neck revealed aggressive cellulitis/myositis at floor of mouth, with an associated necrotic lymph node. ENT consulted to evaluate airway, ruled out alli angina, and rec starting patient on decadron 10 and unasyn. Admit to medicine for abx and airway monitoring.     On evaluation in the ED, the patient is pleasant, in no acute distress. Does endorse some tenderness over anterior neck but otherwise no acute complaints. Denies fever, chills, difficulty swallowing, chest pain, SOB, palpitations, abdominal pain, n/v, diarrhea, constipation, myalgias, arthralgias.    Hospital Course:     Pt was admitted to medicine for close airway monitoring and concern for Alli's angina. ENT was consulted and an endoscope was completed, showing no internal airway involvement or inflammation. Pt was started on IV Unasyn and IV dexamethasone 10 q8 for 24 hours for treatment of cellulitis/myositis and reduction of inflammation. US-guided FNA was completed of submental necrotic lymph node for further evaluation. Gram stain of FNA aspiration was negative, tissue path, flow cytometry and other work up is pending. ID recommended continuation of IV Unasyn and amoxicillin/clavulanate 825 mg BID for  total 10 day course of antibiotics after hospitalization. Pt reports remarkable improvement in swelling with the initiation and continuation of the steroid and antibiotics. On the morning of discharge, pt was feeling well, with improvement in swelling and erythema. Pt tolerated PO diet well and had no SOB/difficulty breathing. Pt appropriate for discharge with ENT follow-up for biopsy results and 10 day course of antibiotics.     Medication changes:   - Start 825 Augmentin BID for 10 days     Follow-up appointments:  - Please follow-up with ENT Dr. Boo for bx results and further evaluation     Code status:   FULL CODE Ms. Douglass is a 34 yo F with neurofibromatosis who presented to ED complaining of anterior neck swelling for 2 days. Patient was in her normal state of health until about two days ago when she noticed swelling of her anterior neck and under jaw. She had mild tenderness but did not think much of it. Se has mild sore throat earlier last week, but has since resolved. Endores mild tenderness when she palpated her anterior neck. Otherwise, no difficulty tolerating diet, tolerating oral secretion. Also denies difficulty swallowing, shortness of breath, or voice change. Denies recent dental procedure, or trauma to mouth. Yesterday she woke up and the swelling was a lot worse which is why she decided to present for evaluation. Labs at ED is significant for leukocytosis at 11.87. CT neck revealed aggressive cellulitis/myositis at floor of mouth, with an associated necrotic lymph node. ENT consulted to evaluate airway, ruled out alli angina, and rec starting patient on decadron 10 and unasyn. Admit to medicine for abx and airway monitoring.     On evaluation in the ED, the patient is pleasant, in no acute distress. Does endorse some tenderness over anterior neck but otherwise no acute complaints. Denies fever, chills, difficulty swallowing, chest pain, SOB, palpitations, abdominal pain, n/v, diarrhea, constipation, myalgias, arthralgias.    Hospital Course:     Pt was admitted to medicine for close airway monitoring and concern for Alli's angina. ENT was consulted and an endoscope was completed, showing no internal airway involvement or inflammation. Pt was started on IV Unasyn and IV dexamethasone 10 q8 for 24 hours for treatment of cellulitis/myositis and reduction of inflammation. US-guided FNA was completed of submental necrotic lymph node for further evaluation. Gram stain of FNA aspiration was negative, tissue path, flow cytometry and other work up is pending. ID recommended continuation of IV Unasyn and amoxicillin/clavulanate 825 mg BID for  total 10 day course of antibiotics after hospitalization. Pt reports remarkable improvement in swelling with the initiation and continuation of the steroid and antibiotics. On the morning of discharge, pt was feeling well, with improvement in swelling and erythema. Pt tolerated PO diet well and had no SOB/difficulty breathing. Pt appropriate for discharge with ENT follow-up for biopsy results and 10 day course of antibiotics.     Medication changes:   - Start 825 Augmentin BID for 9 days     Follow-up appointments:  - Please follow-up with ENT Dr. Boo for bx results and further evaluation     Code status:   FULL CODE Ms. Douglass is a 32 yo F with neurofibromatosis who presented to ED complaining of anterior neck swelling for 2 days. Patient was in her normal state of health until about two days ago when she noticed swelling of her anterior neck and under jaw. She had mild tenderness but did not think much of it. Se has mild sore throat earlier last week, but has since resolved. Endores mild tenderness when she palpated her anterior neck. Otherwise, no difficulty tolerating diet, tolerating oral secretion. Also denies difficulty swallowing, shortness of breath, or voice change. Denies recent dental procedure, or trauma to mouth. Yesterday she woke up and the swelling was a lot worse which is why she decided to present for evaluation. Labs at ED is significant for leukocytosis at 11.87. CT neck revealed aggressive cellulitis/myositis at floor of mouth, with an associated necrotic lymph node. ENT consulted to evaluate airway, ruled out alli angina, and rec starting patient on decadron 10 and unasyn. Admit to medicine for abx and airway monitoring.     On evaluation in the ED, the patient is pleasant, in no acute distress. Does endorse some tenderness over anterior neck but otherwise no acute complaints. Denies fever, chills, difficulty swallowing, chest pain, SOB, palpitations, abdominal pain, n/v, diarrhea, constipation, myalgias, arthralgias.    Hospital Course:     Pt was admitted to medicine for close airway monitoring and concern for Alli's angina. ENT was consulted and an endoscope was completed, showing no internal airway involvement or inflammation. Pt was started on IV Unasyn and IV dexamethasone 10 q8 for 24 hours for treatment of cellulitis/myositis and reduction of inflammation. US-guided FNA was completed of submental necrotic lymph node for further evaluation. Gram stain of FNA aspiration was negative, tissue path, flow cytometry and other work up is pending. ID recommended continuation of IV Unasyn and amoxicillin/clavulanate 825 mg BID for  total 10 day course of antibiotics after hospitalization. Pt reports remarkable improvement in swelling with the initiation and continuation of the steroid and antibiotics. On the morning of discharge, pt was feeling well, with improvement in swelling and erythema. Pt tolerated PO diet well and had no SOB/difficulty breathing. Pt appropriate for discharge with ENT follow-up for biopsy results and 10 day course of antibiotics.     Medication changes:   - Start 825 Augmentin BID for 9 days     Follow-up appointments:  - Please follow-up with ENT Dr. Boo for bx results and further evaluation     Code status:   FULL CODE     Discharge Diagnosis  1. Sepsis due to cellulitis (present on admission)  2. Neurofibromatosis  3. Necrotic lymph node

## 2024-08-30 NOTE — DISCHARGE NOTE PROVIDER - NSDCCPCAREPLAN_GEN_ALL_CORE_FT
PRINCIPAL DISCHARGE DIAGNOSIS  Diagnosis: Cellulitis, neck  Assessment and Plan of Treatment: You were admitted to the hospital [facility] we found on the CT scan that you had an infection of the soft tissue and muscle in the front of your neck. We called the ear/nose/throat doctors to evaluate and make sure you did not have any infection in your airway. They used a scope to determine that your airway was clear. You were started on IV antibiotics and IV steroids to decrease the inflammation and treat the infection. The CT scan found a lymph node that had dead tissue inside. We got a biopsy of the dead lymph node with an ultrasound to determine the cause. The results of this biopsy are still pending. The morning of your discharge, you were feeling better, the swelling at the infection site was going down and we had clarified that the infection was not affecting your airway. You were ready for discharge. Please make sure to follow up with Dr. Boo outside the hospital for further treatment and to recieve the results of the biopsy. You will  be given 10 days of an oral antibiotic to take when you leave the hospital to continue to treat the infection.

## 2024-08-31 LAB
-  CLINDAMYCIN: SIGNIFICANT CHANGE UP
-  ERYTHROMYCIN: SIGNIFICANT CHANGE UP
-  GENTAMICIN: SIGNIFICANT CHANGE UP
-  OXACILLIN: SIGNIFICANT CHANGE UP
-  PENICILLIN: SIGNIFICANT CHANGE UP
-  RIFAMPIN: SIGNIFICANT CHANGE UP
-  TETRACYCLINE: SIGNIFICANT CHANGE UP
-  TRIMETHOPRIM/SULFAMETHOXAZOLE: SIGNIFICANT CHANGE UP
-  VANCOMYCIN: SIGNIFICANT CHANGE UP
METHOD TYPE: SIGNIFICANT CHANGE UP

## 2024-09-02 ENCOUNTER — NON-APPOINTMENT (OUTPATIENT)
Age: 34
End: 2024-09-02

## 2024-09-03 LAB
CULTURE RESULTS: ABNORMAL
ORGANISM # SPEC MICROSCOPIC CNT: ABNORMAL
ORGANISM # SPEC MICROSCOPIC CNT: ABNORMAL
SPECIMEN SOURCE: SIGNIFICANT CHANGE UP

## 2024-09-04 ENCOUNTER — NON-APPOINTMENT (OUTPATIENT)
Age: 34
End: 2024-09-04

## 2024-09-04 LAB — TM INTERPRETATION: SIGNIFICANT CHANGE UP

## 2024-09-07 ENCOUNTER — NON-APPOINTMENT (OUTPATIENT)
Age: 34
End: 2024-09-07

## 2024-09-09 ENCOUNTER — APPOINTMENT (OUTPATIENT)
Dept: OTOLARYNGOLOGY | Facility: CLINIC | Age: 34
End: 2024-09-09
Payer: COMMERCIAL

## 2024-09-09 VITALS
BODY MASS INDEX: 24.91 KG/M2 | DIASTOLIC BLOOD PRESSURE: 74 MMHG | HEART RATE: 95 BPM | SYSTOLIC BLOOD PRESSURE: 110 MMHG | HEIGHT: 66 IN | OXYGEN SATURATION: 98 % | WEIGHT: 155 LBS

## 2024-09-09 DIAGNOSIS — R22.1 LOCALIZED SWELLING, MASS AND LUMP, NECK: ICD-10-CM

## 2024-09-09 PROCEDURE — 99204 OFFICE O/P NEW MOD 45 MIN: CPT | Mod: 25

## 2024-09-09 PROCEDURE — 31575 DIAGNOSTIC LARYNGOSCOPY: CPT

## 2024-09-09 RX ORDER — AMOXICILLIN AND CLAVULANATE POTASSIUM 875; 125 MG/1; MG/1
875-125 TABLET, COATED ORAL
Qty: 14 | Refills: 0 | Status: ACTIVE | COMMUNITY
Start: 2024-09-09 | End: 1900-01-01

## 2024-09-10 LAB — NON-GYNECOLOGICAL CYTOLOGY STUDY: SIGNIFICANT CHANGE UP

## 2024-09-11 ENCOUNTER — APPOINTMENT (OUTPATIENT)
Dept: NUTRITION | Facility: CLINIC | Age: 34
End: 2024-09-11

## 2024-09-11 PROCEDURE — 97803 MED NUTRITION INDIV SUBSEQ: CPT | Mod: 95

## 2024-09-11 NOTE — PHYSICAL EXAM
[Midline] : trachea located in midline position [Normal] : no rashes [de-identified] : 1.5 submental firm indurated lymph node

## 2024-09-11 NOTE — PHYSICAL EXAM
[Midline] : trachea located in midline position [Normal] : no rashes [de-identified] : 1.5 submental firm indurated lymph node

## 2024-09-11 NOTE — HISTORY OF PRESENT ILLNESS
[de-identified] :  33 year old, Female, with PMHx of neurofibromatosis, presented to [facility] ED on 8/29/24 complaining of anterior neck swelling for 2 days. Labs at ED is significant for leucocytosis at 11.87. CT neck revealed aggressive cellulitis/myositis at floor of mouth, with an associated necrotic lymph node.  As per patient, she has mild sore throat earlier last week, but as since resolved. Endorses mild tenderness when she palpated her anterior neck. While she was in the hospital she was given Unasyn x3 IVP, pred and d/c on PO Augmentin that will finish Friday   FNA from 8/29/24 is not available yet  As per pt mass has shrunk down since the hospital but is still palpable.  Denies Smoking hx or ETOH use   Pt denies dysphonia, dysphagia, dyspnea, pain, recent fevers or unintentional weight loss.

## 2024-09-11 NOTE — HISTORY OF PRESENT ILLNESS
[de-identified] :  33 year old, Female, with PMHx of neurofibromatosis, presented to [facility] ED on 8/29/24 complaining of anterior neck swelling for 2 days. Labs at ED is significant for leucocytosis at 11.87. CT neck revealed aggressive cellulitis/myositis at floor of mouth, with an associated necrotic lymph node.  As per patient, she has mild sore throat earlier last week, but as since resolved. Endorses mild tenderness when she palpated her anterior neck. While she was in the hospital she was given Unasyn x3 IVP, pred and d/c on PO Augmentin that will finish Friday   FNA from 8/29/24 is not available yet  As per pt mass has shrunk down since the hospital but is still palpable.  Denies Smoking hx or ETOH use   Pt denies dysphonia, dysphagia, dyspnea, pain, recent fevers or unintentional weight loss.

## 2024-09-11 NOTE — HISTORY OF PRESENT ILLNESS
[de-identified] :  33 year old, Female, with PMHx of neurofibromatosis, presented to [facility] ED on 8/29/24 complaining of anterior neck swelling for 2 days. Labs at ED is significant for leucocytosis at 11.87. CT neck revealed aggressive cellulitis/myositis at floor of mouth, with an associated necrotic lymph node.  As per patient, she has mild sore throat earlier last week, but as since resolved. Endorses mild tenderness when she palpated her anterior neck. While she was in the hospital she was given Unasyn x3 IVP, pred and d/c on PO Augmentin that will finish Friday   FNA from 8/29/24 is not available yet  As per pt mass has shrunk down since the hospital but is still palpable.  Denies Smoking hx or ETOH use   Pt denies dysphonia, dysphagia, dyspnea, pain, recent fevers or unintentional weight loss.

## 2024-09-11 NOTE — PHYSICAL EXAM
[Midline] : trachea located in midline position [Normal] : no rashes [de-identified] : 1.5 submental firm indurated lymph node

## 2024-09-11 NOTE — ASSESSMENT
[FreeTextEntry1] :  33 year old, Female, with PMHx of neurofibromatosis, presented to [facility] ED on 8/29/24 complaining of anterior neck swelling for 2 days. Labs at ED is significant for leukocytosis at 11.87. CT neck revealed aggressive cellulitis/myositis at floor of mouth, with an associated necrotic lymph node.   -Reviewed imaging in depth with pt -Will follow up on pathology from FNA  -Augmentin to clear residual infection  -Follow up in 3 weeks to reassess  -She is in agreement with this plan

## 2024-09-11 NOTE — ASSESSMENT
[FreeTextEntry1] :  33 year old, Female, with PMHx of neurofibromatosis, presented to [facility] ED on 8/29/24 complaining of anterior neck swelling for 2 days. Labs at ED is significant for leukocytosis at 11.87. CT neck revealed aggressive cellulitis/myositis at floor of mouth, with an associated necrotic lymph node.   -Reviewed imaging in depth with pt -Will follow up on pathology from FNA  -Augmentin to clear residual infection  -Follow up in 3 weeks to reassess  -She is in agreement with this plan

## 2024-09-11 NOTE — ASSESSMENT
[FreeTextEntry1] :  33 year old, Female, with PMHx of neurofibromatosis, presented to [facility] ED on 8/29/24 complaining of anterior neck swelling for 2 days. Labs at ED is significant for leukocytosis at 11.87. CT neck revealed aggressive cellulitis/myositis at floor of mouth, with an associated necrotic lymph node.   -Reviewed imaging in depth with pt -Will follow up on pathology from FNA  -Augmentin to clear residual infection  -Follow up in 3 weeks to reassess  -She is in agreement with this plan

## 2024-09-20 ENCOUNTER — APPOINTMENT (OUTPATIENT)
Dept: FAMILY MEDICINE | Facility: CLINIC | Age: 34
End: 2024-09-20
Payer: COMMERCIAL

## 2024-09-20 VITALS
OXYGEN SATURATION: 99 % | HEIGHT: 66 IN | SYSTOLIC BLOOD PRESSURE: 114 MMHG | TEMPERATURE: 98.4 F | DIASTOLIC BLOOD PRESSURE: 78 MMHG | HEART RATE: 80 BPM | BODY MASS INDEX: 24.91 KG/M2 | WEIGHT: 155 LBS

## 2024-09-20 DIAGNOSIS — Z09 ENCOUNTER FOR FOLLOW-UP EXAMINATION AFTER COMPLETED TREATMENT FOR CONDITIONS OTHER THAN MALIGNANT NEOPLASM: ICD-10-CM

## 2024-09-20 DIAGNOSIS — L03.221 CELLULITIS OF NECK: ICD-10-CM

## 2024-09-20 PROCEDURE — 99495 TRANSJ CARE MGMT MOD F2F 14D: CPT

## 2024-09-20 NOTE — HISTORY OF PRESENT ILLNESS
[Post-hospitalization from ___ Hospital] : Post-hospitalization from [unfilled] Hospital [Admitted on: ___] : The patient was admitted on [unfilled] [Discharged on ___] : discharged on [unfilled] [Discharge Summary] : discharge summary [Pertinent Labs] : pertinent labs [Discharge Med List] : discharge medication list [Med Reconciliation] : medication reconciliation has been completed [FreeTextEntry2] :  33 year old, Female, with PMHx of neurofibromatosis, presented to [facility] ED on 8/29/24 complaining of anterior neck swelling for 2 days. Labs at ED is significant for leucocytosis at 11.87. CT neck revealed aggressive cellulitis/myositis at floor of mouth, with an associated necrotic lymph node. Outpatient ENT followup was 9/11 Still on ABX course she feels well.  States she has no symptoms and swelling of neck is 80% better She has no questions

## 2024-09-23 ENCOUNTER — APPOINTMENT (OUTPATIENT)
Dept: NEUROLOGY | Facility: CLINIC | Age: 34
End: 2024-09-23
Payer: COMMERCIAL

## 2024-09-23 DIAGNOSIS — Q85.00 NEUROFIBROMATOSIS, UNSPECIFIED: ICD-10-CM

## 2024-09-23 PROCEDURE — G2211 COMPLEX E/M VISIT ADD ON: CPT | Mod: NC

## 2024-09-23 PROCEDURE — 99215 OFFICE O/P EST HI 40 MIN: CPT | Mod: 95

## 2024-09-23 NOTE — HISTORY OF PRESENT ILLNESS
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 She does not know her family history as she's adopted. She had no history of seizures, optic glioma. There's neurofibromas all over her body. The last time she had and MRI of brain was when she was in high school and it was normal. 1992 mri of brain was unremarkable. She currently has no symptoms. She has upcoming ophthalmology and dermatology appts.   interval history: she is seeing plastics for removal of neurofibroma. She has had no seizures or vision changes. Her last mRI in 2017 showed no acute changes.   Interval history: Patient has had no seizures or vision changes.  Patient's last MRI in 2017 showed no acute changes.  Interval history: Patient has no seizures or vision changes.  Patient's last MRI in 2021 shows stable 0.6 cm left temporal subcutaneous lesion that is likely a neurofibroma.  No orbital or intracranial lesions identified.  Patient has no medication changes or any new diagnoses since her last visit about a year ago.  Patient is doing well  Interval history April 10, 2024: Patient has no seizures or vision changes.  No medication or new diagnoses since her last visit.  Interval history September 23, 2024: Patient recently had an aggressive cellulitis or myositis on her left shin.  Patient went to the hospital and saw ENT.  Patient will continue to follow-up with ENT.  Patient decided to freeze her eggs and started seeing Dr. Tammy Suarez.  She wanted to know the chances of her embryos carrying NF1 gene.  She was advised that this is likely a good question for genetic counselor.  I sent the message on teams to Dr. Suarez regarding any [facility]  that she works with for this question.  Will also wait for clarification from Dr. Suarez regarding any concerns neurologically for the harvesting procedure.     CONSENT FOR VIDEO MEDICAL VISIT1. I understand that my physician wishes me to engage in a telemedicine consultation.2. My physician has explained to me how the video conferencing technology will be used to affect such a consultation will not be the same as a direct patient/health care provider visit due to the fact that I will not be in the same room as my physician 3. I understand there are potential risks to this technology, including interruptions, unauthorized access and technical difficulties. I understand that my health care provider or I can discontinue the telemedicine consult/visit if it is felt that the videoconferencing connections are not adequate for the situation.4. I understand that my healthcare information may be shared with other individuals for scheduling and billing purposes. Others may also be present during the consultation other than my physician and consulting health care provider in order to operate the video equipment. The above mentioned people will all maintain confidentiality of the information obtained. I further understand that I will be informed of their presence in the consultation and thus will have the right to request the following: (1) omit specific details of my medical history/physical examination that are personally sensitive to me; (2) ask non-medical personnel to leave the telemedicine examination room: and or (3) terminate the consultation at any time.5. I have had the alternatives to a telemedicine consultation explained to me, and in choosing to participate in a telemedicine consultation. I understand that some parts of the exam involving physical tests may be conducted by individuals at my location at the direction of the consulting health care provider.6. In an emergent consultation, I understand that the responsibility of the telemedicine consulting specialist is to advise my local practitioner and that the specialists responsibility will conclude upon the termination of the video conference connection.7. I understand that billing will occur from both my physician and as a facility fee from the site from which I am presented.8. I have had a direct conversation with my physician, during which I had the opportunity to ask questions in regard to this procedure. My questions have been answered and the risks, benefits and any practical alternatives have been discussed with me in a language in which I understand

## 2024-09-23 NOTE — DISCUSSION/SUMMARY
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 Will await clarification from Dr. Suarez her OB regarding any concerns she may have about pt going through procedure. There is a question that I think genetics counselor might be better equipped to answer. Will see if Dr. Suarez has any contacts. If not, will send her referral from general genetics.   physician location: office patient location: home  I spent 40 minutes of total time, during which more than 50% of the time was spent on counseling. I explained the diagnosis, other possible diagnoses, workup, and management, as well as answered any questions.  This is a telemedicine visit that was performed using real time 2-way audiovisual technology. Verbal consent to participate in video visit was obtained and patient was aware of their right to refuse to participate in services delivered via telemedicine and the alternative and potential limitations of participating in a telemedicine visit vs a face-to-face visit; I have also informed the patient of my current location in the [facility] and the names of all persons participating in the telemedicine service and their role in the encounter. The patient agrees to have this service via Telehealth.   This visit occurred during the Coronavirus (COVID-19) Public Health Emergency. I discussed with the patient the nature of our telemedicine visits, that:   I would evaluate the patient and recommend diagnostics and treatments based on my assessment   Our sessions are not being recorded and that personal health information is protected   Our team would provide follow up care in person if/when the patient needs it.

## 2024-09-30 ENCOUNTER — APPOINTMENT (OUTPATIENT)
Dept: OTOLARYNGOLOGY | Facility: CLINIC | Age: 34
End: 2024-09-30
Payer: COMMERCIAL

## 2024-09-30 VITALS
HEART RATE: 62 BPM | HEIGHT: 66 IN | WEIGHT: 155 LBS | SYSTOLIC BLOOD PRESSURE: 111 MMHG | OXYGEN SATURATION: 99 % | BODY MASS INDEX: 24.91 KG/M2 | DIASTOLIC BLOOD PRESSURE: 73 MMHG

## 2024-09-30 PROCEDURE — 31575 DIAGNOSTIC LARYNGOSCOPY: CPT

## 2024-09-30 PROCEDURE — 99214 OFFICE O/P EST MOD 30 MIN: CPT | Mod: 25

## 2024-09-30 RX ORDER — AMOXICILLIN AND CLAVULANATE POTASSIUM 875; 125 MG/1; MG/1
875-125 TABLET, COATED ORAL
Qty: 14 | Refills: 0 | Status: ACTIVE | COMMUNITY
Start: 2024-09-30 | End: 1900-01-01

## 2024-10-01 NOTE — HISTORY OF PRESENT ILLNESS
[de-identified] :  33 year old, Female, with PMHx of neurofibromatosis, presented to [facility] ED on 8/29/24 complaining of anterior neck swelling  CT neck revealed aggressive cellulitis/myositis at floor of mouth, with an associated necrotic lymph node.  While she was in the hospital she was given Unasyn x3 IVP, pred and d/c on PO Augmentin, which finished last week. She is doing well in clinic today   FNA from 8/29/24 was negative for malignancy and  AFB and GMS stains were performed and are negative for microorganisms.  Denies Smoking hx or ETOH use   From last visit completed Augmentin course with decrease in neck and mouth swelling.  Pt denies dysphonia, dysphagia, dyspnea, pain, recent fevers or unintentional weight loss.

## 2024-10-01 NOTE — ASSESSMENT
[FreeTextEntry1] :  33 year old, Female, with PMHx of neurofibromatosis, presented to [facility] ED on 8/29/24 complaining of anterior neck swelling for 2 days. Labs at ED is significant for leukocytosis at 11.87. CT neck revealed aggressive cellulitis/myositis at floor of mouth, with an associated necrotic lymph node.   -Reviewed Pathology in depth with pt -Pt is doing well in clinic today, infection largely resolved  -Augmentin, to have on hand if flare up again  -Follow up as needed   -She is in agreement with this plan

## 2024-10-01 NOTE — ASSESSMENT
[FreeTextEntry1] :  33 year old, Female, with PMHx of neurofibromatosis, presented to [facility] ED on 8/29/24 complaining of anterior neck swelling for 2 days. Labs at ED is significant for leukocytosis at 11.87. CT neck revealed aggressive cellulitis/myositis at floor of mouth, with an associated necrotic lymph node.   -Reviewed Pathology in depth with pt -Pt is doing well in clinic today, infection largely resolved  -Augmentin, to have on hand if flare up again  -Follow up as needed   -She is in agreement with this plan

## 2024-10-01 NOTE — HISTORY OF PRESENT ILLNESS
[de-identified] :  33 year old, Female, with PMHx of neurofibromatosis, presented to [facility] ED on 8/29/24 complaining of anterior neck swelling  CT neck revealed aggressive cellulitis/myositis at floor of mouth, with an associated necrotic lymph node.  While she was in the hospital she was given Unasyn x3 IVP, pred and d/c on PO Augmentin, which finished last week. She is doing well in clinic today   FNA from 8/29/24 was negative for malignancy and  AFB and GMS stains were performed and are negative for microorganisms.  Denies Smoking hx or ETOH use   From last visit completed Augmentin course with decrease in neck and mouth swelling.  Pt denies dysphonia, dysphagia, dyspnea, pain, recent fevers or unintentional weight loss.

## 2024-10-01 NOTE — PHYSICAL EXAM
[Midline] : trachea located in midline position [Normal] : no rashes [de-identified] : 1.5 submental firm indurated lymph node

## 2024-10-01 NOTE — ASSESSMENT
[FreeTextEntry1] :  33 year old, Female, with PMHx of neurofibromatosis, presented to [facility] ED on 8/29/24 complaining of anterior neck swelling for 2 days. Labs at ED is significant for leukocytosis at 11.87. CT neck revealed aggressive cellulitis/myositis at floor of mouth, with an associated necrotic lymph node.   -Reviewed Pathology in depth with pt -Pt is doing well in clinic today, infection largely resolved  -Augmentin, to have on hand if flare up again  -Follow up as needed   -She is in agreement with this plan

## 2024-10-01 NOTE — PHYSICAL EXAM
[Midline] : trachea located in midline position [Normal] : no rashes [de-identified] : 1.5 submental firm indurated lymph node

## 2024-10-01 NOTE — PHYSICAL EXAM
[Midline] : trachea located in midline position [Normal] : no rashes [de-identified] : 1.5 submental firm indurated lymph node

## 2024-10-01 NOTE — HISTORY OF PRESENT ILLNESS
[de-identified] :  33 year old, Female, with PMHx of neurofibromatosis, presented to [facility] ED on 8/29/24 complaining of anterior neck swelling  CT neck revealed aggressive cellulitis/myositis at floor of mouth, with an associated necrotic lymph node.  While she was in the hospital she was given Unasyn x3 IVP, pred and d/c on PO Augmentin, which finished last week. She is doing well in clinic today   FNA from 8/29/24 was negative for malignancy and  AFB and GMS stains were performed and are negative for microorganisms.  Denies Smoking hx or ETOH use   From last visit completed Augmentin course with decrease in neck and mouth swelling.  Pt denies dysphonia, dysphagia, dyspnea, pain, recent fevers or unintentional weight loss.

## 2024-10-09 ENCOUNTER — APPOINTMENT (OUTPATIENT)
Dept: PEDIATRIC MEDICAL GENETICS | Facility: CLINIC | Age: 34
End: 2024-10-09

## 2024-10-09 PROCEDURE — XXXXX: CPT | Mod: 1L

## 2024-10-10 ENCOUNTER — APPOINTMENT (OUTPATIENT)
Dept: INFECTIOUS DISEASE | Facility: CLINIC | Age: 34
End: 2024-10-10
Payer: COMMERCIAL

## 2024-10-10 VITALS
OXYGEN SATURATION: 97 % | WEIGHT: 155 LBS | TEMPERATURE: 97.8 F | DIASTOLIC BLOOD PRESSURE: 76 MMHG | SYSTOLIC BLOOD PRESSURE: 113 MMHG | BODY MASS INDEX: 24.91 KG/M2 | HEART RATE: 97 BPM | HEIGHT: 66 IN

## 2024-10-10 DIAGNOSIS — R22.1 LOCALIZED SWELLING, MASS AND LUMP, NECK: ICD-10-CM

## 2024-10-10 PROCEDURE — 99213 OFFICE O/P EST LOW 20 MIN: CPT

## 2024-12-29 NOTE — ASU PATIENT PROFILE, ADULT - PMH
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
Home

## 2025-02-14 ENCOUNTER — OUTPATIENT (OUTPATIENT)
Dept: OUTPATIENT SERVICES | Facility: HOSPITAL | Age: 35
LOS: 1 days | End: 2025-02-14
Payer: COMMERCIAL

## 2025-02-14 ENCOUNTER — APPOINTMENT (OUTPATIENT)
Dept: MRI IMAGING | Facility: IMAGING CENTER | Age: 35
End: 2025-02-14
Payer: COMMERCIAL

## 2025-02-14 DIAGNOSIS — D36.10 BENIGN NEOPLASM OF PERIPHERAL NERVES AND AUTONOMIC NERVOUS SYSTEM, UNSPECIFIED: Chronic | ICD-10-CM

## 2025-02-14 DIAGNOSIS — Z00.8 ENCOUNTER FOR OTHER GENERAL EXAMINATION: ICD-10-CM

## 2025-02-14 PROCEDURE — C8937: CPT

## 2025-02-14 PROCEDURE — C8908: CPT

## 2025-02-14 PROCEDURE — 77049 MRI BREAST C-+ W/CAD BI: CPT | Mod: 26

## 2025-02-21 ENCOUNTER — APPOINTMENT (OUTPATIENT)
Dept: DERMATOLOGY | Facility: CLINIC | Age: 35
End: 2025-02-21
Payer: COMMERCIAL

## 2025-02-21 DIAGNOSIS — R22.1 LOCALIZED SWELLING, MASS AND LUMP, NECK: ICD-10-CM

## 2025-02-21 DIAGNOSIS — Z12.83 ENCOUNTER FOR SCREENING FOR MALIGNANT NEOPLASM OF SKIN: ICD-10-CM

## 2025-02-21 DIAGNOSIS — Q85.00 NEUROFIBROMATOSIS, UNSPECIFIED: ICD-10-CM

## 2025-02-21 PROCEDURE — 99213 OFFICE O/P EST LOW 20 MIN: CPT

## 2025-03-14 ENCOUNTER — APPOINTMENT (OUTPATIENT)
Dept: HUMAN REPRODUCTION | Facility: CLINIC | Age: 35
End: 2025-03-14
Payer: COMMERCIAL

## 2025-03-14 PROCEDURE — 99214 OFFICE O/P EST MOD 30 MIN: CPT

## 2025-03-21 ENCOUNTER — APPOINTMENT (OUTPATIENT)
Dept: FAMILY MEDICINE | Facility: CLINIC | Age: 35
End: 2025-03-21
Payer: COMMERCIAL

## 2025-03-21 VITALS
DIASTOLIC BLOOD PRESSURE: 77 MMHG | BODY MASS INDEX: 25.71 KG/M2 | HEART RATE: 98 BPM | OXYGEN SATURATION: 98 % | TEMPERATURE: 97.2 F | HEIGHT: 66 IN | WEIGHT: 160 LBS | SYSTOLIC BLOOD PRESSURE: 100 MMHG

## 2025-03-21 DIAGNOSIS — Z00.00 ENCOUNTER FOR GENERAL ADULT MEDICAL EXAMINATION W/OUT ABNORMAL FINDINGS: ICD-10-CM

## 2025-03-21 DIAGNOSIS — R73.03 PREDIABETES.: ICD-10-CM

## 2025-03-21 DIAGNOSIS — E55.9 VITAMIN D DEFICIENCY, UNSPECIFIED: ICD-10-CM

## 2025-03-21 PROCEDURE — 36415 COLL VENOUS BLD VENIPUNCTURE: CPT

## 2025-03-21 PROCEDURE — 99395 PREV VISIT EST AGE 18-39: CPT

## 2025-03-26 DIAGNOSIS — E61.1 IRON DEFICIENCY: ICD-10-CM

## 2025-03-26 LAB
25(OH)D3 SERPL-MCNC: 20.3 NG/ML
ALBUMIN SERPL ELPH-MCNC: 4.4 G/DL
ALP BLD-CCNC: 73 U/L
ALT SERPL-CCNC: 9 U/L
ANION GAP SERPL CALC-SCNC: 17 MMOL/L
AST SERPL-CCNC: 19 U/L
BASOPHILS # BLD AUTO: 0.06 K/UL
BASOPHILS NFR BLD AUTO: 1.1 %
BILIRUB SERPL-MCNC: 0.4 MG/DL
BUN SERPL-MCNC: 12 MG/DL
CALCIUM SERPL-MCNC: 9.5 MG/DL
CHLORIDE SERPL-SCNC: 104 MMOL/L
CHOLEST SERPL-MCNC: 174 MG/DL
CO2 SERPL-SCNC: 18 MMOL/L
CREAT SERPL-MCNC: 0.62 MG/DL
EGFRCR SERPLBLD CKD-EPI 2021: 119 ML/MIN/1.73M2
EOSINOPHIL # BLD AUTO: 0.02 K/UL
EOSINOPHIL NFR BLD AUTO: 0.4 %
ESTIMATED AVERAGE GLUCOSE: 111 MG/DL
GLUCOSE SERPL-MCNC: 82 MG/DL
HBA1C MFR BLD HPLC: 5.5 %
HCT VFR BLD CALC: 45.2 %
HDLC SERPL-MCNC: 68 MG/DL
HGB BLD-MCNC: 14.7 G/DL
IMM GRANULOCYTES NFR BLD AUTO: 0.2 %
LDLC SERPL-MCNC: 96 MG/DL
LYMPHOCYTES # BLD AUTO: 1.33 K/UL
LYMPHOCYTES NFR BLD AUTO: 25.1 %
MAN DIFF?: NORMAL
MCHC RBC-ENTMCNC: 30.9 PG
MCHC RBC-ENTMCNC: 32.5 G/DL
MCV RBC AUTO: 95.2 FL
MONOCYTES # BLD AUTO: 0.49 K/UL
MONOCYTES NFR BLD AUTO: 9.3 %
NEUTROPHILS # BLD AUTO: 3.38 K/UL
NEUTROPHILS NFR BLD AUTO: 63.9 %
NONHDLC SERPL-MCNC: 106 MG/DL
PLATELET # BLD AUTO: 326 K/UL
POTASSIUM SERPL-SCNC: 4.6 MMOL/L
PROT SERPL-MCNC: 7.4 G/DL
RBC # BLD: 4.75 M/UL
RBC # FLD: 12.1 %
SODIUM SERPL-SCNC: 138 MMOL/L
TRIGL SERPL-MCNC: 49 MG/DL
TSH SERPL-ACNC: 1.58 UIU/ML
WBC # FLD AUTO: 5.29 K/UL

## 2025-03-27 LAB
FERRITIN SERPL-MCNC: 33 NG/ML
IRON SATN MFR SERPL: 60 %
IRON SERPL-MCNC: 306 UG/DL
TIBC SERPL-MCNC: 506 UG/DL
UIBC SERPL-MCNC: 200 UG/DL

## 2025-03-28 ENCOUNTER — APPOINTMENT (OUTPATIENT)
Dept: HUMAN REPRODUCTION | Facility: CLINIC | Age: 35
End: 2025-03-28
Payer: COMMERCIAL

## 2025-03-28 PROCEDURE — 76857 US EXAM PELVIC LIMITED: CPT

## 2025-03-28 PROCEDURE — 36415 COLL VENOUS BLD VENIPUNCTURE: CPT

## 2025-03-28 PROCEDURE — 99213 OFFICE O/P EST LOW 20 MIN: CPT | Mod: 25

## 2025-04-04 ENCOUNTER — APPOINTMENT (OUTPATIENT)
Dept: HUMAN REPRODUCTION | Facility: CLINIC | Age: 35
End: 2025-04-04
Payer: COMMERCIAL

## 2025-04-04 PROCEDURE — 76830 TRANSVAGINAL US NON-OB: CPT

## 2025-04-04 PROCEDURE — 99213 OFFICE O/P EST LOW 20 MIN: CPT | Mod: 25

## 2025-04-04 PROCEDURE — 36415 COLL VENOUS BLD VENIPUNCTURE: CPT

## 2025-04-04 PROCEDURE — S4042: CPT

## 2025-04-07 ENCOUNTER — APPOINTMENT (OUTPATIENT)
Dept: HUMAN REPRODUCTION | Facility: CLINIC | Age: 35
End: 2025-04-07
Payer: COMMERCIAL

## 2025-04-07 PROCEDURE — 76857 US EXAM PELVIC LIMITED: CPT

## 2025-04-07 PROCEDURE — 36415 COLL VENOUS BLD VENIPUNCTURE: CPT

## 2025-04-07 PROCEDURE — 99213 OFFICE O/P EST LOW 20 MIN: CPT | Mod: 25

## 2025-04-10 ENCOUNTER — APPOINTMENT (OUTPATIENT)
Dept: HUMAN REPRODUCTION | Facility: CLINIC | Age: 35
End: 2025-04-10
Payer: COMMERCIAL

## 2025-04-10 PROCEDURE — 99213 OFFICE O/P EST LOW 20 MIN: CPT | Mod: 25

## 2025-04-10 PROCEDURE — 76857 US EXAM PELVIC LIMITED: CPT

## 2025-04-10 PROCEDURE — 36415 COLL VENOUS BLD VENIPUNCTURE: CPT

## 2025-04-12 ENCOUNTER — APPOINTMENT (OUTPATIENT)
Dept: HUMAN REPRODUCTION | Facility: CLINIC | Age: 35
End: 2025-04-12
Payer: COMMERCIAL

## 2025-04-12 PROCEDURE — 99213 OFFICE O/P EST LOW 20 MIN: CPT | Mod: 25

## 2025-04-12 PROCEDURE — 36415 COLL VENOUS BLD VENIPUNCTURE: CPT

## 2025-04-12 PROCEDURE — 76857 US EXAM PELVIC LIMITED: CPT

## 2025-04-15 ENCOUNTER — APPOINTMENT (OUTPATIENT)
Dept: HUMAN REPRODUCTION | Facility: CLINIC | Age: 35
End: 2025-04-15
Payer: COMMERCIAL

## 2025-04-15 PROCEDURE — 36415 COLL VENOUS BLD VENIPUNCTURE: CPT

## 2025-04-15 PROCEDURE — 99213 OFFICE O/P EST LOW 20 MIN: CPT | Mod: 25

## 2025-04-15 PROCEDURE — 76857 US EXAM PELVIC LIMITED: CPT

## 2025-04-17 ENCOUNTER — APPOINTMENT (OUTPATIENT)
Dept: HUMAN REPRODUCTION | Facility: CLINIC | Age: 35
End: 2025-04-17

## 2025-04-17 PROCEDURE — 36415 COLL VENOUS BLD VENIPUNCTURE: CPT

## 2025-04-17 PROCEDURE — 99213 OFFICE O/P EST LOW 20 MIN: CPT | Mod: 25

## 2025-04-17 PROCEDURE — 76857 US EXAM PELVIC LIMITED: CPT

## 2025-04-18 ENCOUNTER — APPOINTMENT (OUTPATIENT)
Dept: HUMAN REPRODUCTION | Facility: CLINIC | Age: 35
End: 2025-04-18
Payer: COMMERCIAL

## 2025-04-18 PROCEDURE — 76857 US EXAM PELVIC LIMITED: CPT

## 2025-04-18 PROCEDURE — 99213 OFFICE O/P EST LOW 20 MIN: CPT | Mod: 25

## 2025-04-18 PROCEDURE — 36415 COLL VENOUS BLD VENIPUNCTURE: CPT

## 2025-04-19 ENCOUNTER — APPOINTMENT (OUTPATIENT)
Dept: HUMAN REPRODUCTION | Facility: CLINIC | Age: 35
End: 2025-04-19
Payer: COMMERCIAL

## 2025-04-19 PROCEDURE — 36415 COLL VENOUS BLD VENIPUNCTURE: CPT

## 2025-04-19 PROCEDURE — 76857 US EXAM PELVIC LIMITED: CPT

## 2025-04-19 PROCEDURE — 99213 OFFICE O/P EST LOW 20 MIN: CPT | Mod: 25

## 2025-04-21 ENCOUNTER — APPOINTMENT (OUTPATIENT)
Dept: HUMAN REPRODUCTION | Facility: CLINIC | Age: 35
End: 2025-04-21
Payer: COMMERCIAL

## 2025-04-21 PROCEDURE — 89346 STORAGE/YEAR OOCYTE(S): CPT

## 2025-04-21 PROCEDURE — 76948 ECHO GUIDE OVA ASPIRATION: CPT

## 2025-04-21 PROCEDURE — 58970 RETRIEVAL OF OOCYTE: CPT

## 2025-04-21 PROCEDURE — 89254 OOCYTE IDENTIFICATION: CPT

## 2025-04-21 PROCEDURE — 89337 CRYOPRESERVATION OOCYTE(S): CPT

## 2025-04-22 ENCOUNTER — APPOINTMENT (OUTPATIENT)
Dept: HUMAN REPRODUCTION | Facility: CLINIC | Age: 35
End: 2025-04-22
Payer: COMMERCIAL

## 2025-04-23 ENCOUNTER — APPOINTMENT (OUTPATIENT)
Dept: HUMAN REPRODUCTION | Facility: CLINIC | Age: 35
End: 2025-04-23
Payer: COMMERCIAL

## 2025-04-23 PROCEDURE — 99214 OFFICE O/P EST MOD 30 MIN: CPT | Mod: 95

## 2025-04-28 ENCOUNTER — APPOINTMENT (OUTPATIENT)
Dept: HUMAN REPRODUCTION | Facility: CLINIC | Age: 35
End: 2025-04-28
Payer: COMMERCIAL

## 2025-04-28 PROCEDURE — 99213 OFFICE O/P EST LOW 20 MIN: CPT | Mod: 25

## 2025-04-28 PROCEDURE — 36415 COLL VENOUS BLD VENIPUNCTURE: CPT

## 2025-04-28 PROCEDURE — 76857 US EXAM PELVIC LIMITED: CPT

## 2025-04-29 ENCOUNTER — APPOINTMENT (OUTPATIENT)
Dept: OPHTHALMOLOGY | Facility: CLINIC | Age: 35
End: 2025-04-29
Payer: COMMERCIAL

## 2025-04-29 PROCEDURE — 92014 COMPRE OPH EXAM EST PT 1/>: CPT

## 2025-04-30 ENCOUNTER — APPOINTMENT (OUTPATIENT)
Dept: OBGYN | Facility: CLINIC | Age: 35
End: 2025-04-30

## 2025-04-30 ENCOUNTER — NON-APPOINTMENT (OUTPATIENT)
Age: 35
End: 2025-04-30

## 2025-04-30 VITALS
HEART RATE: 105 BPM | DIASTOLIC BLOOD PRESSURE: 77 MMHG | SYSTOLIC BLOOD PRESSURE: 114 MMHG | BODY MASS INDEX: 25.23 KG/M2 | WEIGHT: 157 LBS | HEIGHT: 66 IN

## 2025-04-30 DIAGNOSIS — Z01.419 ENCOUNTER FOR GYNECOLOGICAL EXAMINATION (GENERAL) (ROUTINE) W/OUT ABNORMAL FINDINGS: ICD-10-CM

## 2025-04-30 PROCEDURE — 96127 BRIEF EMOTIONAL/BEHAV ASSMT: CPT

## 2025-04-30 PROCEDURE — 99459 PELVIC EXAMINATION: CPT

## 2025-04-30 PROCEDURE — 99395 PREV VISIT EST AGE 18-39: CPT

## 2025-05-02 ENCOUNTER — APPOINTMENT (OUTPATIENT)
Dept: HUMAN REPRODUCTION | Facility: CLINIC | Age: 35
End: 2025-05-02

## 2025-05-02 PROCEDURE — 36415 COLL VENOUS BLD VENIPUNCTURE: CPT

## 2025-05-02 PROCEDURE — 76857 US EXAM PELVIC LIMITED: CPT

## 2025-05-02 PROCEDURE — S4042: CPT

## 2025-05-02 PROCEDURE — 99213 OFFICE O/P EST LOW 20 MIN: CPT | Mod: 25

## 2025-05-05 ENCOUNTER — APPOINTMENT (OUTPATIENT)
Dept: HUMAN REPRODUCTION | Facility: CLINIC | Age: 35
End: 2025-05-05

## 2025-05-05 PROCEDURE — 36415 COLL VENOUS BLD VENIPUNCTURE: CPT

## 2025-05-05 PROCEDURE — 76857 US EXAM PELVIC LIMITED: CPT

## 2025-05-05 PROCEDURE — 99213 OFFICE O/P EST LOW 20 MIN: CPT | Mod: 25

## 2025-05-08 ENCOUNTER — APPOINTMENT (OUTPATIENT)
Dept: HUMAN REPRODUCTION | Facility: CLINIC | Age: 35
End: 2025-05-08

## 2025-05-08 PROCEDURE — 76857 US EXAM PELVIC LIMITED: CPT

## 2025-05-08 PROCEDURE — 99213 OFFICE O/P EST LOW 20 MIN: CPT | Mod: 25

## 2025-05-08 PROCEDURE — 36415 COLL VENOUS BLD VENIPUNCTURE: CPT

## 2025-05-13 ENCOUNTER — APPOINTMENT (OUTPATIENT)
Dept: HUMAN REPRODUCTION | Facility: CLINIC | Age: 35
End: 2025-05-13

## 2025-05-13 PROCEDURE — 76857 US EXAM PELVIC LIMITED: CPT

## 2025-05-13 PROCEDURE — 36415 COLL VENOUS BLD VENIPUNCTURE: CPT

## 2025-05-13 PROCEDURE — 99213 OFFICE O/P EST LOW 20 MIN: CPT | Mod: 25

## 2025-05-14 ENCOUNTER — APPOINTMENT (OUTPATIENT)
Dept: HUMAN REPRODUCTION | Facility: CLINIC | Age: 35
End: 2025-05-14

## 2025-05-14 PROCEDURE — 76857 US EXAM PELVIC LIMITED: CPT

## 2025-05-14 PROCEDURE — 36415 COLL VENOUS BLD VENIPUNCTURE: CPT

## 2025-05-14 PROCEDURE — 99213 OFFICE O/P EST LOW 20 MIN: CPT | Mod: 25

## 2025-05-15 ENCOUNTER — APPOINTMENT (OUTPATIENT)
Dept: HUMAN REPRODUCTION | Facility: CLINIC | Age: 35
End: 2025-05-15

## 2025-05-15 PROCEDURE — 36415 COLL VENOUS BLD VENIPUNCTURE: CPT

## 2025-05-16 ENCOUNTER — APPOINTMENT (OUTPATIENT)
Dept: HUMAN REPRODUCTION | Facility: CLINIC | Age: 35
End: 2025-05-16
Payer: COMMERCIAL

## 2025-05-16 PROCEDURE — 89346 STORAGE/YEAR OOCYTE(S): CPT | Mod: NC

## 2025-05-16 PROCEDURE — 89254 OOCYTE IDENTIFICATION: CPT

## 2025-05-16 PROCEDURE — 89337 CRYOPRESERVATION OOCYTE(S): CPT

## 2025-05-16 PROCEDURE — 58970 RETRIEVAL OF OOCYTE: CPT

## 2025-05-16 PROCEDURE — 76948 ECHO GUIDE OVA ASPIRATION: CPT

## 2025-05-17 ENCOUNTER — APPOINTMENT (OUTPATIENT)
Dept: HUMAN REPRODUCTION | Facility: CLINIC | Age: 35
End: 2025-05-17
Payer: COMMERCIAL

## 2025-05-27 ENCOUNTER — APPOINTMENT (OUTPATIENT)
Dept: HUMAN REPRODUCTION | Facility: CLINIC | Age: 35
End: 2025-05-27
Payer: COMMERCIAL

## 2025-05-27 PROCEDURE — 99213 OFFICE O/P EST LOW 20 MIN: CPT

## 2025-05-27 PROCEDURE — 76857 US EXAM PELVIC LIMITED: CPT
